# Patient Record
Sex: FEMALE | Race: WHITE | NOT HISPANIC OR LATINO | Employment: OTHER | URBAN - NONMETROPOLITAN AREA
[De-identification: names, ages, dates, MRNs, and addresses within clinical notes are randomized per-mention and may not be internally consistent; named-entity substitution may affect disease eponyms.]

---

## 2023-03-13 PROBLEM — E78.5 HYPERLIPIDEMIA, UNSPECIFIED: Status: ACTIVE | Noted: 2023-03-13

## 2023-03-13 PROBLEM — R91.1 LUNG NODULE SEEN ON IMAGING STUDY: Status: ACTIVE | Noted: 2023-03-13

## 2023-03-13 PROBLEM — S93.401A SPRAIN OF RIGHT ANKLE: Status: ACTIVE | Noted: 2023-03-13

## 2023-03-13 PROBLEM — K22.89 MASS OF ESOPHAGUS: Status: ACTIVE | Noted: 2023-03-13

## 2023-03-13 PROBLEM — R00.2 PALPITATIONS: Status: ACTIVE | Noted: 2023-03-13

## 2023-03-13 PROBLEM — R73.9 ELEVATED BLOOD SUGAR: Status: ACTIVE | Noted: 2023-03-13

## 2023-03-13 PROBLEM — M79.89 MASS OF SOFT TISSUE OF CHEST: Status: ACTIVE | Noted: 2023-03-13

## 2023-03-13 PROBLEM — M81.0 OSTEOPOROSIS: Status: ACTIVE | Noted: 2023-03-13

## 2023-03-13 PROBLEM — Q45.8 ENTERIC DUPLICATION CYST: Status: ACTIVE | Noted: 2023-03-13

## 2023-03-13 PROBLEM — E03.9 HYPOTHYROID: Status: ACTIVE | Noted: 2023-03-13

## 2023-03-13 PROBLEM — N95.2 ATROPHIC VAGINITIS: Status: ACTIVE | Noted: 2023-03-13

## 2023-03-13 PROBLEM — K64.9 HEMORRHOID: Status: ACTIVE | Noted: 2023-03-13

## 2023-03-13 PROBLEM — Q39.8 CONGENITAL DUPLICATION CYST OF ESOPHAGUS: Status: ACTIVE | Noted: 2023-03-13

## 2023-03-13 PROBLEM — K44.9 HIATAL HERNIA: Status: ACTIVE | Noted: 2023-03-13

## 2023-03-13 PROBLEM — M50.90 CERVICAL DISC DISEASE: Status: ACTIVE | Noted: 2023-03-13

## 2023-03-13 PROBLEM — E66.3 OVERWEIGHT WITH BODY MASS INDEX (BMI) OF 28 TO 28.9 IN ADULT: Status: ACTIVE | Noted: 2023-03-13

## 2023-03-13 PROBLEM — R53.82 CHRONIC FATIGUE: Status: ACTIVE | Noted: 2023-03-13

## 2023-03-13 RX ORDER — ESTRADIOL 10 UG/1
INSERT VAGINAL
COMMUNITY

## 2023-03-13 RX ORDER — LEVOTHYROXINE SODIUM 112 UG/1
1 TABLET ORAL DAILY
COMMUNITY
Start: 2018-03-23 | End: 2023-07-18

## 2023-03-13 RX ORDER — ALENDRONATE SODIUM 70 MG/1
70 TABLET ORAL
COMMUNITY
Start: 2022-01-07 | End: 2023-03-24 | Stop reason: SINTOL

## 2023-03-13 RX ORDER — MULTIVITAMIN
TABLET ORAL
COMMUNITY

## 2023-03-24 ENCOUNTER — TELEMEDICINE (OUTPATIENT)
Dept: PRIMARY CARE | Facility: CLINIC | Age: 63
End: 2023-03-24
Payer: COMMERCIAL

## 2023-03-24 DIAGNOSIS — K21.9 GASTROESOPHAGEAL REFLUX DISEASE WITHOUT ESOPHAGITIS: Primary | ICD-10-CM

## 2023-03-24 PROBLEM — S93.401A SPRAIN OF RIGHT ANKLE: Status: RESOLVED | Noted: 2023-03-13 | Resolved: 2023-03-24

## 2023-03-24 PROCEDURE — 99213 OFFICE O/P EST LOW 20 MIN: CPT | Performed by: FAMILY MEDICINE

## 2023-03-24 NOTE — ASSESSMENT & PLAN NOTE
Discussed with her that without other sx and the HR fine - was likely gerd -   Stop alendronate and try pepcid or prilosec daily for 3 mos -   She agreed to plan - to let me know if not getting better

## 2023-03-24 NOTE — PROGRESS NOTES
Subjective   Patient ID: Neelam Garsia is a 63 y.o. female who presents for HEART ISSUES.    HPI     2 episodes of chest pain - radiating into her back  -   Did have nausea with the first one     But on heart monitor all was fine    HR  83 - 83 when she was having her pain     Did eat greasy foods that night   S/p jeyson     ON alendronate weekly     Pepcid does help  - and it did help with the last episodes       Review of Systems    Objective   There were no vitals taken for this visit.    Physical Exam    NAD     Assessment/Plan   Problem List Items Addressed This Visit    None  Visit Diagnoses       Gastroesophageal reflux disease without esophagitis    -  Primary          Discussed with her that without other sx and the HR fine - was likely gerd -   Stop alendronate and try pepcid or prilosec daily for 3 mos -   She agreed to plan - to let me know if not getting better

## 2023-07-18 DIAGNOSIS — E03.9 HYPOTHYROIDISM, UNSPECIFIED TYPE: Primary | ICD-10-CM

## 2023-07-18 RX ORDER — LEVOTHYROXINE SODIUM 112 UG/1
TABLET ORAL
Qty: 90 TABLET | Refills: 1 | Status: SHIPPED | OUTPATIENT
Start: 2023-07-18 | End: 2024-01-19

## 2024-01-04 ENCOUNTER — APPOINTMENT (OUTPATIENT)
Dept: PRIMARY CARE | Facility: CLINIC | Age: 64
End: 2024-01-04
Payer: COMMERCIAL

## 2024-01-10 ENCOUNTER — APPOINTMENT (OUTPATIENT)
Dept: PRIMARY CARE | Facility: CLINIC | Age: 64
End: 2024-01-10
Payer: COMMERCIAL

## 2024-01-19 DIAGNOSIS — E03.9 HYPOTHYROIDISM, UNSPECIFIED TYPE: ICD-10-CM

## 2024-01-19 RX ORDER — LEVOTHYROXINE SODIUM 112 UG/1
TABLET ORAL
Qty: 90 TABLET | Refills: 0 | Status: SHIPPED | OUTPATIENT
Start: 2024-01-19 | End: 2024-04-25

## 2024-02-20 ASSESSMENT — PROMIS GLOBAL HEALTH SCALE
RATE_AVERAGE_FATIGUE: MILD
CARRYOUT_SOCIAL_ACTIVITIES: EXCELLENT
RATE_GENERAL_HEALTH: GOOD
RATE_MENTAL_HEALTH: EXCELLENT
CARRYOUT_PHYSICAL_ACTIVITIES: COMPLETELY
EMOTIONAL_PROBLEMS: RARELY
RATE_QUALITY_OF_LIFE: VERY GOOD
RATE_AVERAGE_PAIN: 2
RATE_SOCIAL_SATISFACTION: EXCELLENT
RATE_PHYSICAL_HEALTH: GOOD

## 2024-02-21 ENCOUNTER — OFFICE VISIT (OUTPATIENT)
Dept: PRIMARY CARE | Facility: CLINIC | Age: 64
End: 2024-02-21
Payer: COMMERCIAL

## 2024-02-21 VITALS
DIASTOLIC BLOOD PRESSURE: 62 MMHG | SYSTOLIC BLOOD PRESSURE: 104 MMHG | HEART RATE: 80 BPM | BODY MASS INDEX: 28.86 KG/M2 | OXYGEN SATURATION: 97 % | HEIGHT: 60 IN | WEIGHT: 147 LBS

## 2024-02-21 DIAGNOSIS — E78.5 HYPERLIPIDEMIA, UNSPECIFIED HYPERLIPIDEMIA TYPE: ICD-10-CM

## 2024-02-21 DIAGNOSIS — E03.9 HYPOTHYROIDISM, UNSPECIFIED TYPE: ICD-10-CM

## 2024-02-21 DIAGNOSIS — Z00.00 WELLNESS EXAMINATION: Primary | ICD-10-CM

## 2024-02-21 DIAGNOSIS — M81.0 OSTEOPOROSIS, UNSPECIFIED OSTEOPOROSIS TYPE, UNSPECIFIED PATHOLOGICAL FRACTURE PRESENCE: ICD-10-CM

## 2024-02-21 LAB
25(OH)D3 SERPL-MCNC: 65 NG/ML (ref 30–100)
ALBUMIN SERPL BCP-MCNC: 4.4 G/DL (ref 3.4–5)
ALP SERPL-CCNC: 66 U/L (ref 33–136)
ALT SERPL W P-5'-P-CCNC: 20 U/L (ref 7–45)
ANION GAP SERPL CALC-SCNC: 12 MMOL/L (ref 10–20)
AST SERPL W P-5'-P-CCNC: 18 U/L (ref 9–39)
BASOPHILS # BLD AUTO: 0.02 X10*3/UL (ref 0–0.1)
BASOPHILS NFR BLD AUTO: 0.3 %
BILIRUB SERPL-MCNC: 0.6 MG/DL (ref 0–1.2)
BUN SERPL-MCNC: 17 MG/DL (ref 6–23)
CALCIUM SERPL-MCNC: 10.2 MG/DL (ref 8.6–10.3)
CHLORIDE SERPL-SCNC: 106 MMOL/L (ref 98–107)
CHOLEST SERPL-MCNC: 277 MG/DL (ref 0–199)
CHOLESTEROL/HDL RATIO: 6.4
CO2 SERPL-SCNC: 27 MMOL/L (ref 21–32)
CREAT SERPL-MCNC: 0.72 MG/DL (ref 0.5–1.05)
EGFRCR SERPLBLD CKD-EPI 2021: >90 ML/MIN/1.73M*2
EOSINOPHIL # BLD AUTO: 0.14 X10*3/UL (ref 0–0.7)
EOSINOPHIL NFR BLD AUTO: 2.3 %
ERYTHROCYTE [DISTWIDTH] IN BLOOD BY AUTOMATED COUNT: 12.7 % (ref 11.5–14.5)
GLUCOSE SERPL-MCNC: 93 MG/DL (ref 74–99)
HCT VFR BLD AUTO: 47 % (ref 36–46)
HDLC SERPL-MCNC: 43.3 MG/DL
HGB BLD-MCNC: 15.7 G/DL (ref 12–16)
IMM GRANULOCYTES # BLD AUTO: 0.01 X10*3/UL (ref 0–0.7)
IMM GRANULOCYTES NFR BLD AUTO: 0.2 % (ref 0–0.9)
LDLC SERPL CALC-MCNC: 203 MG/DL
LYMPHOCYTES # BLD AUTO: 1.86 X10*3/UL (ref 1.2–4.8)
LYMPHOCYTES NFR BLD AUTO: 30.8 %
MCH RBC QN AUTO: 31 PG (ref 26–34)
MCHC RBC AUTO-ENTMCNC: 33.4 G/DL (ref 32–36)
MCV RBC AUTO: 93 FL (ref 80–100)
MONOCYTES # BLD AUTO: 0.68 X10*3/UL (ref 0.1–1)
MONOCYTES NFR BLD AUTO: 11.3 %
NEUTROPHILS # BLD AUTO: 3.32 X10*3/UL (ref 1.2–7.7)
NEUTROPHILS NFR BLD AUTO: 55.1 %
NON HDL CHOLESTEROL: 234 MG/DL (ref 0–149)
NRBC BLD-RTO: 0 /100 WBCS (ref 0–0)
PLATELET # BLD AUTO: 241 X10*3/UL (ref 150–450)
POTASSIUM SERPL-SCNC: 4.6 MMOL/L (ref 3.5–5.3)
PROT SERPL-MCNC: 6.8 G/DL (ref 6.4–8.2)
RBC # BLD AUTO: 5.07 X10*6/UL (ref 4–5.2)
SODIUM SERPL-SCNC: 140 MMOL/L (ref 136–145)
TRIGL SERPL-MCNC: 155 MG/DL (ref 0–149)
TSH SERPL-ACNC: 0.86 MIU/L (ref 0.44–3.98)
VLDL: 31 MG/DL (ref 0–40)
WBC # BLD AUTO: 6 X10*3/UL (ref 4.4–11.3)

## 2024-02-21 PROCEDURE — 80053 COMPREHEN METABOLIC PANEL: CPT

## 2024-02-21 PROCEDURE — 84443 ASSAY THYROID STIM HORMONE: CPT

## 2024-02-21 PROCEDURE — 36415 COLL VENOUS BLD VENIPUNCTURE: CPT

## 2024-02-21 PROCEDURE — 99396 PREV VISIT EST AGE 40-64: CPT | Performed by: FAMILY MEDICINE

## 2024-02-21 PROCEDURE — 1036F TOBACCO NON-USER: CPT | Performed by: FAMILY MEDICINE

## 2024-02-21 PROCEDURE — 82306 VITAMIN D 25 HYDROXY: CPT

## 2024-02-21 PROCEDURE — 99214 OFFICE O/P EST MOD 30 MIN: CPT | Performed by: FAMILY MEDICINE

## 2024-02-21 PROCEDURE — 80061 LIPID PANEL: CPT

## 2024-02-21 PROCEDURE — 85025 COMPLETE CBC W/AUTO DIFF WBC: CPT

## 2024-02-21 RX ORDER — LANOLIN ALCOHOL/MO/W.PET/CERES
1000 CREAM (GRAM) TOPICAL DAILY
COMMUNITY

## 2024-02-21 NOTE — PROGRESS NOTES
Subjective   Patient ID: Neelam Garsia is a 64 y.o. female who presents for Annual Exam (Yearly physical, not WWE).    HPI   LOV 10/2022 - wellness and check up   3/2023 - Tele appt - GERD sx -  Stopped alendronate and to take PPI     12/2022 - her for palpitations   Ziopatch ordered  - did not have signif findings       Updates and Concerns:       Having fun with grand-babies  Moved closer!        Feeling better off alendronate -   GERD is rare now           Chronic issues reviwed today:   HYPOTHRYOID - due for TSH check   Diagnosed about 2008   On levo 112 mcg,   Takes med regularly.     Osteoporosis -    Alendronate was started 12/2021   Stopped alendronate 2023 due to side effects     Hyperlipidemia -   Tends to run high, was on statin in the past she did not tolerate   2018  HDL 39.6   2019  HDL 40    2020  HDL 38    2022  HDL 35.5            [place]  -         [facility] -   Due in June   Last pap -   LMP - periods stopped about age 53  On vagifem tabs     Last mammogram - 7/2023   Breast issues? - none  No BRCA in family      Bone density  Last DEXA - 12/2021 - osteoporosis -   ON alendronate since 12/2021  - stopped in 2023   fracture history - NONE  Ca/Vit D: she thinks ok , on Vit D      Last colonoscopy - 11/4/ 2022-  WNL      Last cholesterol - When she exercises and takes eat better - chol improves      Last blood sugar - 2018 95     2019 102      2020 103 A1C 5.3% - trying to stop pepsi      Need coronary calcium score? - 2/2020 - low Coronary CT score - 39       small lung nodules seen - CT of chest done 2/2020 and 5/2020 - lung nodules stable    but nodule near esophagus - referred to CT surgeon - DR Al Martínez - she states    it was monitored for 2 years and was stable -       CT on chart 9/7/2021 - was stable 18 mos              11/2022 - stable - no further follow up      Hep C screen? - NEG 2019   HIV screen? - LOW RISK     "  vaccines -  Flu - did not get one for 2 years   Pneumonia  - plan at 66  RSV - not yet   COVID -  had primary series x 2   Tdap -   Shingrix - did not get those      vision -  last year       dentist - goes regularly     BMI/weight - 28   nutrition -\"not horrible\"   exercise -   5 - 6 days a week - 60 - 90 min      depression - she feels its ok      sleep - DOING WELL  - has nights when     smoking status - smoked a small amout for 2 years - years ago   Need Screening Lung CT? -see above      alcohol consumption - occas. weekend - MORE RARE NOW       LIVING WILL/MEDICAL POA - DOES NOT HAVE THEM - would like them       Review of Systems    Objective   /62 (BP Location: Right arm, Patient Position: Sitting, BP Cuff Size: Large adult)   Pulse 80   Ht 1.53 m (5' 0.25\")   Wt 66.7 kg (147 lb)   SpO2 97%   BMI 28.47 kg/m²     Physical Exam  Vitals reviewed.   Constitutional:       General: She is not in acute distress.     Appearance: Normal appearance. She is not ill-appearing, toxic-appearing or diaphoretic.   HENT:      Head: Normocephalic and atraumatic.      Right Ear: Tympanic membrane, ear canal and external ear normal.      Left Ear: Tympanic membrane, ear canal and external ear normal.      Nose: Nose normal.      Mouth/Throat:      Mouth: Mucous membranes are moist.      Pharynx: No posterior oropharyngeal erythema.   Eyes:      General: No scleral icterus.     Extraocular Movements: Extraocular movements intact.      Pupils: Pupils are equal, round, and reactive to light.   Cardiovascular:      Rate and Rhythm: Normal rate and regular rhythm.      Pulses: Normal pulses.      Heart sounds: No murmur heard.  Pulmonary:      Effort: Pulmonary effort is normal. No respiratory distress.      Breath sounds: Normal breath sounds. No wheezing.   Abdominal:      General: Bowel sounds are normal. There is no distension.      Palpations: Abdomen is soft.      Tenderness: There is no abdominal tenderness. "   Musculoskeletal:         General: Normal range of motion.      Cervical back: Neck supple. No rigidity.      Right lower leg: No edema.      Left lower leg: No edema.   Lymphadenopathy:      Cervical: No cervical adenopathy.   Skin:     General: Skin is warm and dry.      Findings: No rash.   Neurological:      General: No focal deficit present.      Mental Status: She is alert and oriented to person, place, and time.   Psychiatric:         Mood and Affect: Mood normal.         Thought Content: Thought content normal.         Assessment/Plan   Problem List Items Addressed This Visit             ICD-10-CM       High    Hypothyroid E03.9    Osteoporosis M81.0       Medium    Hyperlipidemia, unspecified E78.5     Other Visit Diagnoses         Codes    Wellness examination    -  Primary Z00.00          Gen wellness today     And follow up for thyroid, chol and osteoporosis    Working hard at eating better and better exercise     Check on labs     Urged dexa    Sees gyn     We discussed at visit any disease processes that were of concern as well as the risks, benefits and instructions of any new medication provided.    See orders and discussion section for information handed to patient on their Clinical Summary.   Patient (and/or caretaker of patient if present)  stated all questions were answered, and they voiced understanding of instructions.

## 2024-02-21 NOTE — PATIENT INSTRUCTIONS
Try the Mediterranean diet for your cholesterol       When you have your living will and medical poa papers done - bring copies in       TAKING THYROID MEDICATION     It's very important that you take your thyroid medication first thing in the morning on an empty stomach.   You should only take it with water.  You should wait at least an hour before eating or drinking anything else.   Do not eat or drink (or take pills) that have iron or calcium in them for at least 3 hours after taking your thyroid medication.  Iron and calcium significantly effect the absorption of the medicine.     Try very hard to remember to take your pill every day.    Even missing one dose can effect your thyroid levels.   If you miss doses, its important to tell your provider when you are having your blood drawn.     If we adjust your dose of your medication, its very important to be sure you have a recheck of your thyroid level about 6 weeks after that adjustment.    This might need to be an office visit, or just a lab appointment - discuss that with your provider to see which they prefer.      Thyroid medication is most often a lifelong medication.  Never stop taking your thyroid medication unless your provider tells you to do so.         WELL VISIT/PREVENTATIVE VISIT INSTRUCTIONS:        Call [phone] to schedule any testing ordered at [facility].      For a mammogram, call   [phone] .    Please work on healthy nutrition,  optimal sleep, and regular exercise to feel better.    If you smoke - please quit, and if you drink alcohol, please limit your intake.       Be sure to ask me about any vaccines you may be due for,  and ask me any questions you may have about vaccines.   Generally -  a flu shot is recommended every fall for everyone over 6 months of age,  a pneumonia shot is recommended for anyone 65 and over or who has chronic conditions such as diabetes, heart or lung disease,  the shingles vaccines are recommended for  "people age 50 and over,   a Tetnas/Pertussis booster is very 10 years (after the childhood set);  the RSV vaccine is for people age 65 and over,  and the COVID vaccines are recommended for everyone, but the boosters are often particular people, so ask me if you qualify.      There may be more vaccines you qualify for depending on your medical conditions, so be sure to ask me about that if you have any chronic illnesses.    Some people have insurance that will pay for the vaccines at the pharmacy, and some your doctors office - so be sure to check with your insurance about the best place to get your vaccines and your coverage of them.     Generally speaking,  good nutrition consists of lean meats, like chicken, fish and turkey (not fried);    plenty of fruits and vegetables (the fresher the better);   Less sugars, saturated fats, and processed foods - especially those made with white flour.   Try to eat the majority of your grain foods  as whole grains.   Keep an eye on your total calories in a day and serving sizes on packaging - this will help you limit your overall intake.    Remember, to lose weight (if you need to), your total calorie intake has to be about 300 - 500 calories less than what you burn in a day.   That number depends on your age, gender and body size.   Some people find a fitbit (calorie tracking device) helpful.      Please be sure to see the dentist every 6 months.    Please see the eye doctor no longer than every 2 years.    When you have your Living Will and Medical Power of  papers completed   (they have to be witnessed by 2 non-relatives or notarized to be legally binding),     please keep your originals in a safe place in your home, but make sure all your physicians have copies and that you take copies with you when you go to the hospital.        GETTING TEST RESULTS:    YOU WILL ALWAYS FIND OUT ABOUT ALL YOUR TEST RESULTS FROM ME.  I do not use the \"no news is good news\" policy. " "  The only time you would not, is if I have told you to get the testing done, and make a follow up appointment to go over it.    Then I will be waiting to talk to you at the visit about your test results.     I have a few different ways I get test results to you  (if its something urgent, we call you)  :       If you have a Secureach card -  I will have your test results on your secureach card within a week.  You should get a phone call telling you when the results are on the card, or just call the card in a week.   If two weeks go  by and you have not heard anything, call the card to see if the results are there,  and if not,  leave me a message.  If you are having trouble using [employer]ach, they have a support line you should call :   [phone];   If you have lost your card, I need to know.     IT'S VERY IMPORTANT THAT YOU CALL TO LISTEN TO YOUR RESULTS, AS IT THEN LETS ME KNOW YOU GOT YOUR RESULTS.        If you get your test results on MY CHART,  you may commonly see your results even before I do.      I will always annotate a message on your results so you know that I saw them and how I feel about them.     If you need some help with MY CHART call the support number at [phone].    IF I mail your results to you,   I need to hear from you if you do not receive them within 2 weeks.      Be sure to let me know your preference for getting results.         BILLING FOR PREVENTATIVE VISITS.     Most insurance companies pay for a yearly \"Wellness Check\"  or they may call it a \"Preventative Physical\"   - but it's important to know what your plan covers ahead of the visit.    It's also a good idea to find out what sort of preventative testing they will cover for screening tests - like cholesterol, blood sugar, or colonoscopies. Also, find out which vaccines are covered and if you have any responsibility in paying for them.       If at your Wellness Visit,  we cover anything to do with problems/issues  you " are having or  chronic medications/ medical conditions you have,   there will ALSO be a regular office charge that day.     Blood work  or testing obtained that has anything to do with an acute issue or chronic condition is billed under that diagnosis and not screening.       It's a good idea to find out from your insurance what is covered and what is your responsibility for all of the above possible charges.           Most importantly,   if you ever have any questions or concerns that cannot wait until your next visit with me,  you can message me through MY CHART or call the office and leave a voice mail message  (please allow for at least 24 hours for responses for these messages).       Take good care.   Dr Del Cid      For your bone health,  you want to be getting at least 3 servings of a high calcium food or drink every day.  For example:  1 cup of milk, 1 cup of yogurt, or 1 ounce of hard cheese   EACH  has 300 mg.  You can also find calcium in some non-dairy foods such as broccoli and almonds.  The daily goal  is 1000 - 1200 mg of TOTAL calcium intake a day.   If you are able,  you can easily find a list of high calcium foods on the Internet.      Also, most people need to take a Vitamin D 3 supplement,   For small children, the dose is 400 IU a day, and older children should have 800 IU a day.   Adults can be 1000 - 2000 IU a day, with some needing 5000 a day.   Be sure to verify  how much you should take.   LOOK FOR USP CERTIFIED vitamins.    This is a label you will find on the vitamins which verifies how good they are.

## 2024-03-01 ENCOUNTER — APPOINTMENT (OUTPATIENT)
Dept: RADIOLOGY | Facility: HOSPITAL | Age: 64
End: 2024-03-01
Payer: COMMERCIAL

## 2024-03-06 ENCOUNTER — HOSPITAL ENCOUNTER (OUTPATIENT)
Dept: RADIOLOGY | Facility: HOSPITAL | Age: 64
Discharge: HOME | End: 2024-03-06
Payer: COMMERCIAL

## 2024-03-06 DIAGNOSIS — M81.0 OSTEOPOROSIS, UNSPECIFIED OSTEOPOROSIS TYPE, UNSPECIFIED PATHOLOGICAL FRACTURE PRESENCE: ICD-10-CM

## 2024-03-06 PROCEDURE — 77080 DXA BONE DENSITY AXIAL: CPT | Performed by: RADIOLOGY

## 2024-03-06 PROCEDURE — 77080 DXA BONE DENSITY AXIAL: CPT

## 2024-03-11 ENCOUNTER — PATIENT MESSAGE (OUTPATIENT)
Dept: PRIMARY CARE | Facility: CLINIC | Age: 64
End: 2024-03-11
Payer: COMMERCIAL

## 2024-03-11 DIAGNOSIS — M81.0 OSTEOPOROSIS, UNSPECIFIED OSTEOPOROSIS TYPE, UNSPECIFIED PATHOLOGICAL FRACTURE PRESENCE: Primary | ICD-10-CM

## 2024-03-11 NOTE — TELEPHONE ENCOUNTER
From: Neelam Garsia  To: Beth Jean MD  Sent: 3/11/2024 10:14 AM EDT  Subject: Bone Density     Good Morning,  Per your message, I would like a referral for a rheumatologist just to see what medication options there are for osteoporosis and then I will decide which route to go.    If the one you suggest is not covered by my insurance, am I able to just pick one off of the insurance list?    Thank you!  Neelam Garsia

## 2024-04-25 DIAGNOSIS — E03.9 HYPOTHYROIDISM, UNSPECIFIED TYPE: ICD-10-CM

## 2024-04-25 RX ORDER — LEVOTHYROXINE SODIUM 112 UG/1
TABLET ORAL
Qty: 90 TABLET | Refills: 2 | Status: SHIPPED | OUTPATIENT
Start: 2024-04-25

## 2024-07-23 ENCOUNTER — HOSPITAL ENCOUNTER (OUTPATIENT)
Dept: RADIOLOGY | Facility: HOSPITAL | Age: 64
Discharge: HOME | End: 2024-07-23
Payer: COMMERCIAL

## 2024-07-23 VITALS — WEIGHT: 146 LBS | BODY MASS INDEX: 27.56 KG/M2 | HEIGHT: 61 IN

## 2024-07-23 DIAGNOSIS — Z12.31 BREAST CANCER SCREENING BY MAMMOGRAM: ICD-10-CM

## 2024-07-23 PROCEDURE — 77063 BREAST TOMOSYNTHESIS BI: CPT | Performed by: RADIOLOGY

## 2024-07-23 PROCEDURE — 77067 SCR MAMMO BI INCL CAD: CPT | Performed by: RADIOLOGY

## 2024-07-23 PROCEDURE — 77067 SCR MAMMO BI INCL CAD: CPT

## 2024-09-30 ENCOUNTER — TELEPHONE (OUTPATIENT)
Dept: PRIMARY CARE | Facility: CLINIC | Age: 64
End: 2024-09-30
Payer: COMMERCIAL

## 2024-09-30 DIAGNOSIS — M81.0 OSTEOPOROSIS, UNSPECIFIED OSTEOPOROSIS TYPE, UNSPECIFIED PATHOLOGICAL FRACTURE PRESENCE: Primary | ICD-10-CM

## 2024-09-30 RX ORDER — IBANDRONATE SODIUM 150 MG/1
150 TABLET, FILM COATED ORAL
Qty: 1 TABLET | Refills: 11 | Status: SHIPPED | OUTPATIENT
Start: 2024-09-30 | End: 2025-09-30

## 2024-09-30 NOTE — TELEPHONE ENCOUNTER
It is Rheum     Let her know I sent in Boniva for her to try to mario   
Pt informed   
Would you send me in that once a month mediation for osteoporosis?  We talked about it in February BUT I can't get in to see a rheumatologist until next June (this is what I wanted to try first) but I don't know if I should wait that long?  Or do I need it right now?    (SHE SAID RHEUMATOLOGIST but I think she may have meant to say ORTHO-  
08-Oct-2021 20:53

## 2024-10-22 ENCOUNTER — HOSPITAL ENCOUNTER (OUTPATIENT)
Dept: RADIOLOGY | Facility: HOSPITAL | Age: 64
Discharge: HOME | End: 2024-10-22
Payer: COMMERCIAL

## 2024-10-22 DIAGNOSIS — M81.0 AGE-RELATED OSTEOPOROSIS WITHOUT CURRENT PATHOLOGICAL FRACTURE: ICD-10-CM

## 2024-10-22 PROCEDURE — 72072 X-RAY EXAM THORAC SPINE 3VWS: CPT

## 2024-10-22 PROCEDURE — 72110 X-RAY EXAM L-2 SPINE 4/>VWS: CPT

## 2024-10-22 PROCEDURE — 72072 X-RAY EXAM THORAC SPINE 3VWS: CPT | Performed by: RADIOLOGY

## 2024-10-22 PROCEDURE — 72110 X-RAY EXAM L-2 SPINE 4/>VWS: CPT | Performed by: RADIOLOGY

## 2024-11-06 ENCOUNTER — APPOINTMENT (OUTPATIENT)
Dept: PRIMARY CARE | Facility: CLINIC | Age: 64
End: 2024-11-06
Payer: COMMERCIAL

## 2024-12-11 ENCOUNTER — APPOINTMENT (OUTPATIENT)
Dept: CARDIOLOGY | Facility: HOSPITAL | Age: 64
End: 2024-12-11
Payer: COMMERCIAL

## 2024-12-11 ENCOUNTER — APPOINTMENT (OUTPATIENT)
Dept: RADIOLOGY | Facility: HOSPITAL | Age: 64
End: 2024-12-11
Payer: COMMERCIAL

## 2024-12-11 ENCOUNTER — HOSPITAL ENCOUNTER (OUTPATIENT)
Facility: HOSPITAL | Age: 64
Setting detail: OBSERVATION
Discharge: HOME | End: 2024-12-11
Attending: EMERGENCY MEDICINE | Admitting: INTERNAL MEDICINE
Payer: COMMERCIAL

## 2024-12-11 VITALS
SYSTOLIC BLOOD PRESSURE: 100 MMHG | OXYGEN SATURATION: 97 % | WEIGHT: 146.6 LBS | HEIGHT: 61 IN | BODY MASS INDEX: 27.68 KG/M2 | DIASTOLIC BLOOD PRESSURE: 65 MMHG | RESPIRATION RATE: 17 BRPM | HEART RATE: 77 BPM | TEMPERATURE: 97.9 F

## 2024-12-11 DIAGNOSIS — R07.9 CHEST PAIN, UNSPECIFIED TYPE: Primary | ICD-10-CM

## 2024-12-11 DIAGNOSIS — E78.5 HYPERLIPIDEMIA, UNSPECIFIED HYPERLIPIDEMIA TYPE: ICD-10-CM

## 2024-12-11 DIAGNOSIS — K21.9 GASTROESOPHAGEAL REFLUX DISEASE WITHOUT ESOPHAGITIS: ICD-10-CM

## 2024-12-11 LAB
ALBUMIN SERPL BCP-MCNC: 4.3 G/DL (ref 3.4–5)
ALP SERPL-CCNC: 78 U/L (ref 33–136)
ALT SERPL W P-5'-P-CCNC: 22 U/L (ref 7–45)
ANION GAP SERPL CALC-SCNC: 15 MMOL/L (ref 10–20)
AORTIC VALVE MEAN GRADIENT: 3 MMHG
AORTIC VALVE PEAK VELOCITY: 1.28 M/S
AST SERPL W P-5'-P-CCNC: 26 U/L (ref 9–39)
AV PEAK GRADIENT: 7 MMHG
AVA (PEAK VEL): 2.76 CM2
AVA (VTI): 2.6 CM2
BASOPHILS # BLD AUTO: 0.03 X10*3/UL (ref 0–0.1)
BASOPHILS NFR BLD AUTO: 0.3 %
BILIRUB SERPL-MCNC: 0.5 MG/DL (ref 0–1.2)
BUN SERPL-MCNC: 16 MG/DL (ref 6–23)
CALCIUM SERPL-MCNC: 10.1 MG/DL (ref 8.6–10.3)
CARDIAC TROPONIN I PNL SERPL HS: 3 NG/L (ref 0–13)
CARDIAC TROPONIN I PNL SERPL HS: 3 NG/L (ref 0–13)
CARDIAC TROPONIN I PNL SERPL HS: 4 NG/L (ref 0–13)
CHLORIDE SERPL-SCNC: 101 MMOL/L (ref 98–107)
CHOLEST SERPL-MCNC: 214 MG/DL (ref 0–199)
CHOLESTEROL/HDL RATIO: 6.3
CO2 SERPL-SCNC: 27 MMOL/L (ref 21–32)
CREAT SERPL-MCNC: 0.7 MG/DL (ref 0.5–1.05)
EGFRCR SERPLBLD CKD-EPI 2021: >90 ML/MIN/1.73M*2
EJECTION FRACTION APICAL 4 CHAMBER: 67.9
EJECTION FRACTION: 63 %
EOSINOPHIL # BLD AUTO: 0.19 X10*3/UL (ref 0–0.7)
EOSINOPHIL NFR BLD AUTO: 1.9 %
ERYTHROCYTE [DISTWIDTH] IN BLOOD BY AUTOMATED COUNT: 12.7 % (ref 11.5–14.5)
EST. AVERAGE GLUCOSE BLD GHB EST-MCNC: 105 MG/DL
GLUCOSE SERPL-MCNC: 112 MG/DL (ref 74–99)
HBA1C MFR BLD: 5.3 %
HCT VFR BLD AUTO: 45.6 % (ref 36–46)
HDLC SERPL-MCNC: 33.9 MG/DL
HGB BLD-MCNC: 15.5 G/DL (ref 12–16)
IMM GRANULOCYTES # BLD AUTO: 0.01 X10*3/UL (ref 0–0.7)
IMM GRANULOCYTES NFR BLD AUTO: 0.1 % (ref 0–0.9)
LDLC SERPL CALC-MCNC: 154 MG/DL
LEFT ATRIUM VOLUME AREA LENGTH INDEX BSA: 17.9 ML/M2
LEFT VENTRICLE INTERNAL DIMENSION DIASTOLE: 4.26 CM (ref 3.5–6)
LEFT VENTRICULAR OUTFLOW TRACT DIAMETER: 2.03 CM
LIPASE SERPL-CCNC: 18 U/L (ref 9–82)
LYMPHOCYTES # BLD AUTO: 3.82 X10*3/UL (ref 1.2–4.8)
LYMPHOCYTES NFR BLD AUTO: 38.2 %
MAGNESIUM SERPL-MCNC: 1.97 MG/DL (ref 1.6–2.4)
MCH RBC QN AUTO: 30.9 PG (ref 26–34)
MCHC RBC AUTO-ENTMCNC: 34 G/DL (ref 32–36)
MCV RBC AUTO: 91 FL (ref 80–100)
MITRAL VALVE E/A RATIO: 0.91
MONOCYTES # BLD AUTO: 1.04 X10*3/UL (ref 0.1–1)
MONOCYTES NFR BLD AUTO: 10.4 %
NEUTROPHILS # BLD AUTO: 4.92 X10*3/UL (ref 1.2–7.7)
NEUTROPHILS NFR BLD AUTO: 49.1 %
NON HDL CHOLESTEROL: 180 MG/DL (ref 0–149)
NRBC BLD-RTO: 0 /100 WBCS (ref 0–0)
PLATELET # BLD AUTO: 288 X10*3/UL (ref 150–450)
POTASSIUM SERPL-SCNC: 3.7 MMOL/L (ref 3.5–5.3)
PROT SERPL-MCNC: 6.7 G/DL (ref 6.4–8.2)
RBC # BLD AUTO: 5.01 X10*6/UL (ref 4–5.2)
RIGHT VENTRICLE FREE WALL PEAK S': 13 CM/S
RIGHT VENTRICLE PEAK SYSTOLIC PRESSURE: 21.4 MMHG
SODIUM SERPL-SCNC: 139 MMOL/L (ref 136–145)
TRICUSPID ANNULAR PLANE SYSTOLIC EXCURSION: 1.7 CM
TRIGL SERPL-MCNC: 133 MG/DL (ref 0–149)
VLDL: 27 MG/DL (ref 0–40)
WBC # BLD AUTO: 10 X10*3/UL (ref 4.4–11.3)

## 2024-12-11 PROCEDURE — 93306 TTE W/DOPPLER COMPLETE: CPT

## 2024-12-11 PROCEDURE — G0378 HOSPITAL OBSERVATION PER HR: HCPCS

## 2024-12-11 PROCEDURE — 80061 LIPID PANEL: CPT | Performed by: INTERNAL MEDICINE

## 2024-12-11 PROCEDURE — 96374 THER/PROPH/DIAG INJ IV PUSH: CPT | Mod: 59

## 2024-12-11 PROCEDURE — 84484 ASSAY OF TROPONIN QUANT: CPT | Performed by: EMERGENCY MEDICINE

## 2024-12-11 PROCEDURE — 78452 HT MUSCLE IMAGE SPECT MULT: CPT

## 2024-12-11 PROCEDURE — A9502 TC99M TETROFOSMIN: HCPCS | Performed by: NURSE PRACTITIONER

## 2024-12-11 PROCEDURE — 94760 N-INVAS EAR/PLS OXIMETRY 1: CPT

## 2024-12-11 PROCEDURE — 83735 ASSAY OF MAGNESIUM: CPT | Performed by: EMERGENCY MEDICINE

## 2024-12-11 PROCEDURE — 83036 HEMOGLOBIN GLYCOSYLATED A1C: CPT | Mod: GEALAB | Performed by: INTERNAL MEDICINE

## 2024-12-11 PROCEDURE — 3430000001 HC RX 343 DIAGNOSTIC RADIOPHARMACEUTICALS: Performed by: NURSE PRACTITIONER

## 2024-12-11 PROCEDURE — 36415 COLL VENOUS BLD VENIPUNCTURE: CPT | Performed by: EMERGENCY MEDICINE

## 2024-12-11 PROCEDURE — 99222 1ST HOSP IP/OBS MODERATE 55: CPT | Performed by: INTERNAL MEDICINE

## 2024-12-11 PROCEDURE — 71275 CT ANGIOGRAPHY CHEST: CPT | Performed by: RADIOLOGY

## 2024-12-11 PROCEDURE — 74174 CTA ABD&PLVS W/CONTRAST: CPT

## 2024-12-11 PROCEDURE — 99239 HOSP IP/OBS DSCHRG MGMT >30: CPT | Performed by: PHYSICIAN ASSISTANT

## 2024-12-11 PROCEDURE — 93017 CV STRESS TEST TRACING ONLY: CPT

## 2024-12-11 PROCEDURE — 78452 HT MUSCLE IMAGE SPECT MULT: CPT | Performed by: RADIOLOGY

## 2024-12-11 PROCEDURE — 74174 CTA ABD&PLVS W/CONTRAST: CPT | Performed by: RADIOLOGY

## 2024-12-11 PROCEDURE — 83690 ASSAY OF LIPASE: CPT | Performed by: EMERGENCY MEDICINE

## 2024-12-11 PROCEDURE — 80053 COMPREHEN METABOLIC PANEL: CPT | Performed by: EMERGENCY MEDICINE

## 2024-12-11 PROCEDURE — 93005 ELECTROCARDIOGRAM TRACING: CPT

## 2024-12-11 PROCEDURE — 84484 ASSAY OF TROPONIN QUANT: CPT | Performed by: INTERNAL MEDICINE

## 2024-12-11 PROCEDURE — 93306 TTE W/DOPPLER COMPLETE: CPT | Performed by: INTERNAL MEDICINE

## 2024-12-11 PROCEDURE — 99285 EMERGENCY DEPT VISIT HI MDM: CPT | Mod: 25 | Performed by: EMERGENCY MEDICINE

## 2024-12-11 PROCEDURE — 2500000002 HC RX 250 W HCPCS SELF ADMINISTERED DRUGS (ALT 637 FOR MEDICARE OP, ALT 636 FOR OP/ED): Performed by: INTERNAL MEDICINE

## 2024-12-11 PROCEDURE — 2550000001 HC RX 255 CONTRASTS: Performed by: EMERGENCY MEDICINE

## 2024-12-11 PROCEDURE — 85025 COMPLETE CBC W/AUTO DIFF WBC: CPT | Performed by: EMERGENCY MEDICINE

## 2024-12-11 PROCEDURE — 93005 ELECTROCARDIOGRAM TRACING: CPT | Mod: 59

## 2024-12-11 PROCEDURE — 96376 TX/PRO/DX INJ SAME DRUG ADON: CPT | Mod: 59

## 2024-12-11 RX ORDER — NAPROXEN SODIUM 220 MG/1
81 TABLET, FILM COATED ORAL DAILY
Status: DISCONTINUED | OUTPATIENT
Start: 2024-12-12 | End: 2024-12-11 | Stop reason: HOSPADM

## 2024-12-11 RX ORDER — ATORVASTATIN CALCIUM 80 MG/1
40 TABLET, FILM COATED ORAL NIGHTLY
Qty: 15 TABLET | Refills: 1 | Status: SHIPPED | OUTPATIENT
Start: 2024-12-11 | End: 2025-02-09

## 2024-12-11 RX ORDER — ATORVASTATIN CALCIUM 80 MG/1
80 TABLET, FILM COATED ORAL NIGHTLY
Status: DISCONTINUED | OUTPATIENT
Start: 2024-12-11 | End: 2024-12-11 | Stop reason: HOSPADM

## 2024-12-11 RX ORDER — ACETAMINOPHEN 160 MG/5ML
650 SOLUTION ORAL EVERY 4 HOURS PRN
Status: DISCONTINUED | OUTPATIENT
Start: 2024-12-11 | End: 2024-12-11 | Stop reason: HOSPADM

## 2024-12-11 RX ORDER — ACETAMINOPHEN 650 MG/1
650 SUPPOSITORY RECTAL EVERY 4 HOURS PRN
Status: DISCONTINUED | OUTPATIENT
Start: 2024-12-11 | End: 2024-12-11 | Stop reason: HOSPADM

## 2024-12-11 RX ORDER — ENOXAPARIN SODIUM 100 MG/ML
40 INJECTION SUBCUTANEOUS EVERY 24 HOURS
Status: DISCONTINUED | OUTPATIENT
Start: 2024-12-11 | End: 2024-12-11 | Stop reason: HOSPADM

## 2024-12-11 RX ORDER — ACETAMINOPHEN 325 MG/1
650 TABLET ORAL EVERY 4 HOURS PRN
Status: DISCONTINUED | OUTPATIENT
Start: 2024-12-11 | End: 2024-12-11 | Stop reason: HOSPADM

## 2024-12-11 RX ORDER — LEVOTHYROXINE SODIUM 112 UG/1
112 TABLET ORAL
Status: DISCONTINUED | OUTPATIENT
Start: 2024-12-11 | End: 2024-12-11 | Stop reason: HOSPADM

## 2024-12-11 SDOH — SOCIAL STABILITY: SOCIAL INSECURITY: HAS ANYONE EVER THREATENED TO HURT YOUR FAMILY OR YOUR PETS?: NO

## 2024-12-11 SDOH — SOCIAL STABILITY: SOCIAL INSECURITY
WITHIN THE LAST YEAR, HAVE YOU BEEN RAPED OR FORCED TO HAVE ANY KIND OF SEXUAL ACTIVITY BY YOUR PARTNER OR EX-PARTNER?: NO

## 2024-12-11 SDOH — ECONOMIC STABILITY: HOUSING INSECURITY: IN THE PAST 12 MONTHS, HOW MANY TIMES HAVE YOU MOVED WHERE YOU WERE LIVING?: 0

## 2024-12-11 SDOH — SOCIAL STABILITY: SOCIAL INSECURITY: WITHIN THE LAST YEAR, HAVE YOU BEEN AFRAID OF YOUR PARTNER OR EX-PARTNER?: NO

## 2024-12-11 SDOH — SOCIAL STABILITY: SOCIAL INSECURITY: ABUSE: ADULT

## 2024-12-11 SDOH — ECONOMIC STABILITY: FOOD INSECURITY: WITHIN THE PAST 12 MONTHS, YOU WORRIED THAT YOUR FOOD WOULD RUN OUT BEFORE YOU GOT THE MONEY TO BUY MORE.: NEVER TRUE

## 2024-12-11 SDOH — ECONOMIC STABILITY: HOUSING INSECURITY: AT ANY TIME IN THE PAST 12 MONTHS, WERE YOU HOMELESS OR LIVING IN A SHELTER (INCLUDING NOW)?: NO

## 2024-12-11 SDOH — ECONOMIC STABILITY: HOUSING INSECURITY: IN THE LAST 12 MONTHS, WAS THERE A TIME WHEN YOU WERE NOT ABLE TO PAY THE MORTGAGE OR RENT ON TIME?: NO

## 2024-12-11 SDOH — ECONOMIC STABILITY: INCOME INSECURITY: IN THE PAST 12 MONTHS HAS THE ELECTRIC, GAS, OIL, OR WATER COMPANY THREATENED TO SHUT OFF SERVICES IN YOUR HOME?: NO

## 2024-12-11 SDOH — SOCIAL STABILITY: SOCIAL INSECURITY: ARE YOU OR HAVE YOU BEEN THREATENED OR ABUSED PHYSICALLY, EMOTIONALLY, OR SEXUALLY BY ANYONE?: NO

## 2024-12-11 SDOH — SOCIAL STABILITY: SOCIAL INSECURITY
WITHIN THE LAST YEAR, HAVE YOU BEEN KICKED, HIT, SLAPPED, OR OTHERWISE PHYSICALLY HURT BY YOUR PARTNER OR EX-PARTNER?: NO

## 2024-12-11 SDOH — SOCIAL STABILITY: SOCIAL INSECURITY: HAVE YOU HAD ANY THOUGHTS OF HARMING ANYONE ELSE?: NO

## 2024-12-11 SDOH — ECONOMIC STABILITY: FOOD INSECURITY: HOW HARD IS IT FOR YOU TO PAY FOR THE VERY BASICS LIKE FOOD, HOUSING, MEDICAL CARE, AND HEATING?: NOT HARD AT ALL

## 2024-12-11 SDOH — SOCIAL STABILITY: SOCIAL INSECURITY: ARE THERE ANY APPARENT SIGNS OF INJURIES/BEHAVIORS THAT COULD BE RELATED TO ABUSE/NEGLECT?: NO

## 2024-12-11 SDOH — SOCIAL STABILITY: SOCIAL INSECURITY: DO YOU FEEL ANYONE HAS EXPLOITED OR TAKEN ADVANTAGE OF YOU FINANCIALLY OR OF YOUR PERSONAL PROPERTY?: NO

## 2024-12-11 SDOH — ECONOMIC STABILITY: FOOD INSECURITY: WITHIN THE PAST 12 MONTHS, THE FOOD YOU BOUGHT JUST DIDN'T LAST AND YOU DIDN'T HAVE MONEY TO GET MORE.: NEVER TRUE

## 2024-12-11 SDOH — SOCIAL STABILITY: SOCIAL INSECURITY: WITHIN THE LAST YEAR, HAVE YOU BEEN HUMILIATED OR EMOTIONALLY ABUSED IN OTHER WAYS BY YOUR PARTNER OR EX-PARTNER?: NO

## 2024-12-11 SDOH — SOCIAL STABILITY: SOCIAL INSECURITY: WERE YOU ABLE TO COMPLETE ALL THE BEHAVIORAL HEALTH SCREENINGS?: YES

## 2024-12-11 SDOH — ECONOMIC STABILITY: TRANSPORTATION INSECURITY: IN THE PAST 12 MONTHS, HAS LACK OF TRANSPORTATION KEPT YOU FROM MEDICAL APPOINTMENTS OR FROM GETTING MEDICATIONS?: NO

## 2024-12-11 SDOH — SOCIAL STABILITY: SOCIAL INSECURITY: DOES ANYONE TRY TO KEEP YOU FROM HAVING/CONTACTING OTHER FRIENDS OR DOING THINGS OUTSIDE YOUR HOME?: NO

## 2024-12-11 SDOH — SOCIAL STABILITY: SOCIAL INSECURITY: HAVE YOU HAD THOUGHTS OF HARMING ANYONE ELSE?: NO

## 2024-12-11 SDOH — SOCIAL STABILITY: SOCIAL INSECURITY: DO YOU FEEL UNSAFE GOING BACK TO THE PLACE WHERE YOU ARE LIVING?: NO

## 2024-12-11 ASSESSMENT — HEART SCORE
AGE: 45-64
HISTORY: MODERATELY SUSPICIOUS
TROPONIN: LESS THAN OR EQUAL TO NORMAL LIMIT
RISK FACTORS: >2 RISK FACTORS OR HX OF ATHEROSCLEROTIC DISEASE
ECG: NORMAL
HEART SCORE: 4

## 2024-12-11 ASSESSMENT — COGNITIVE AND FUNCTIONAL STATUS - GENERAL
MOBILITY SCORE: 24
PATIENT BASELINE BEDBOUND: NO
DAILY ACTIVITIY SCORE: 24
DAILY ACTIVITIY SCORE: 24
MOBILITY SCORE: 24

## 2024-12-11 ASSESSMENT — ENCOUNTER SYMPTOMS
HEMATURIA: 0
DIARRHEA: 0
EYE PAIN: 0
WOUND: 0
SORE THROAT: 0
PALPITATIONS: 0
ARTHRALGIAS: 0
COUGH: 0
ABDOMINAL PAIN: 0
FEVER: 0
SHORTNESS OF BREATH: 1
BACK PAIN: 0
HEADACHES: 0
NERVOUS/ANXIOUS: 0
DYSURIA: 0
VOMITING: 0
DIZZINESS: 0
CHILLS: 0
DYSPHORIC MOOD: 0
NAUSEA: 1

## 2024-12-11 ASSESSMENT — PAIN - FUNCTIONAL ASSESSMENT
PAIN_FUNCTIONAL_ASSESSMENT: 0-10

## 2024-12-11 ASSESSMENT — PAIN SCALES - GENERAL
PAINLEVEL_OUTOF10: 0 - NO PAIN

## 2024-12-11 ASSESSMENT — ACTIVITIES OF DAILY LIVING (ADL)
ADEQUATE_TO_COMPLETE_ADL: YES
DRESSING YOURSELF: INDEPENDENT
WALKS IN HOME: INDEPENDENT
LACK_OF_TRANSPORTATION: NO
FEEDING YOURSELF: INDEPENDENT
PATIENT'S MEMORY ADEQUATE TO SAFELY COMPLETE DAILY ACTIVITIES?: YES
HEARING - RIGHT EAR: FUNCTIONAL
ASSISTIVE_DEVICE: EYEGLASSES
GROOMING: INDEPENDENT
TOILETING: INDEPENDENT
HEARING - LEFT EAR: FUNCTIONAL
BATHING: INDEPENDENT
LACK_OF_TRANSPORTATION: NO
JUDGMENT_ADEQUATE_SAFELY_COMPLETE_DAILY_ACTIVITIES: YES

## 2024-12-11 ASSESSMENT — COLUMBIA-SUICIDE SEVERITY RATING SCALE - C-SSRS
6. HAVE YOU EVER DONE ANYTHING, STARTED TO DO ANYTHING, OR PREPARED TO DO ANYTHING TO END YOUR LIFE?: NO
2. HAVE YOU ACTUALLY HAD ANY THOUGHTS OF KILLING YOURSELF?: NO
1. IN THE PAST MONTH, HAVE YOU WISHED YOU WERE DEAD OR WISHED YOU COULD GO TO SLEEP AND NOT WAKE UP?: NO

## 2024-12-11 ASSESSMENT — LIFESTYLE VARIABLES
HOW OFTEN DO YOU HAVE A DRINK CONTAINING ALCOHOL: NEVER
HOW OFTEN DO YOU HAVE 6 OR MORE DRINKS ON ONE OCCASION: NEVER
SKIP TO QUESTIONS 9-10: 1
AUDIT-C TOTAL SCORE: 0
HAVE PEOPLE ANNOYED YOU BY CRITICIZING YOUR DRINKING: NO
EVER HAD A DRINK FIRST THING IN THE MORNING TO STEADY YOUR NERVES TO GET RID OF A HANGOVER: NO
HOW MANY STANDARD DRINKS CONTAINING ALCOHOL DO YOU HAVE ON A TYPICAL DAY: PATIENT DOES NOT DRINK
TOTAL SCORE: 0
AUDIT-C TOTAL SCORE: 0
HAVE YOU EVER FELT YOU SHOULD CUT DOWN ON YOUR DRINKING: NO
EVER FELT BAD OR GUILTY ABOUT YOUR DRINKING: NO

## 2024-12-11 ASSESSMENT — PATIENT HEALTH QUESTIONNAIRE - PHQ9
2. FEELING DOWN, DEPRESSED OR HOPELESS: NOT AT ALL
SUM OF ALL RESPONSES TO PHQ9 QUESTIONS 1 & 2: 0
1. LITTLE INTEREST OR PLEASURE IN DOING THINGS: NOT AT ALL

## 2024-12-11 NOTE — CONSULTS
"Inpatient consult to Cardiology  Consult performed by: HUSSEIN Duque-CNP  Consult ordered by: Alexandra Tong PA-C        History Of Present Illness:    Neelam Garsia is a 64 y.o. female from home with h/o GERD, hld (statin intolerant), hypothyroidism, osteoporosis admitted with chest pain.  Was the passenger in the car yesterday when she developed sudden onset midsternal chest discomfort with associated diaphoresis and nausea.  Symptoms lasted for a few minutes and resolved.  Also radiated to the mid back.  Her and  went to lunch and she felt improved, however, at 1am she was lying in bed and recurrent onset of chest pain radiating to the back, so her  called 911.  Troponin 4, 3.  CTA c/a/p negative for PE, aortic aneurysm or dissection, +small hiatal hernia noted.  Chest pain resolved prior to EMS arrival to her home and has not reoccurred.      Last Recorded Vitals:  Vitals:    12/11/24 0600 12/11/24 0700 12/11/24 0741 12/11/24 1100   BP: 137/77 141/85 111/73    BP Location:   Right arm    Patient Position:   Lying    Pulse: 72 77 74    Resp: 18 18 18    Temp: 36.5 °C (97.7 °F)  36.5 °C (97.7 °F)    TempSrc:   Temporal    SpO2: 97% 96% 94% 95%   Weight:   66.5 kg (146 lb 9.6 oz)    Height:   1.549 m (5' 1\")        Last Labs:  CBC - 12/11/2024:  3:45 AM  10.0 15.5 288    45.6      CMP - 12/11/2024:  3:45 AM  10.1 6.7 26 --- 0.5   _ 4.3 22 78      PTT - No results in last year.  _   _ _     Troponin I, High Sensitivity   Date/Time Value Ref Range Status   12/11/2024 07:59 AM 3 0 - 13 ng/L Final   12/11/2024 05:10 AM 3 0 - 13 ng/L Final   12/11/2024 03:45 AM 4 0 - 13 ng/L Final     Hemoglobin A1C   Date/Time Value Ref Range Status   10/28/2022 11:00 AM 4.9 % Final     Comment:          Diagnosis of Diabetes-Adults   Non-Diabetic: < or = 5.6%   Increased risk for developing diabetes: 5.7-6.4%   Diagnostic of diabetes: > or = 6.5%  .       Monitoring of Diabetes                Age (y)     " Therapeutic Goal (%)   Adults:          >18           <7.0   Pediatrics:    13-18           <7.5                   7-12           <8.0                   0- 6            7.5-8.5   American Diabetes Association. Diabetes Care 33(S1), Jan 2010.     06/30/2021 12:00 PM 5.3 % Final     Comment:          Diagnosis of Diabetes-Adults   Non-Diabetic: < or = 5.6%   Increased risk for developing diabetes: 5.7-6.4%   Diagnostic of diabetes: > or = 6.5%  .       Monitoring of Diabetes                Age (y)     Therapeutic Goal (%)   Adults:          >18           <7.0   Pediatrics:    13-18           <7.5                   7-12           <8.0                   0- 6            7.5-8.5   American Diabetes Association. Diabetes Care 33(S1), Jan 2010.       LDL Calculated   Date/Time Value Ref Range Status   12/11/2024 05:10  (H) <=99 mg/dL Final     Comment:                                 Near   Borderline      AGE      Desirable  Optimal    High     High     Very High     0-19 Y     0 - 109     ---    110-129   >/= 130     ----    20-24 Y     0 - 119     ---    120-159   >/= 160     ----      >24 Y     0 -  99   100-129  130-159   160-189     >/=190     02/21/2024 10:14  (H) <=99 mg/dL Final     Comment:                                 Near   Borderline      AGE      Desirable  Optimal    High     High     Very High     0-19 Y     0 - 109     ---    110-129   >/= 130     ----    20-24 Y     0 - 119     ---    120-159   >/= 160     ----      >24 Y     0 -  99   100-129  130-159   160-189     >/=190       VLDL   Date/Time Value Ref Range Status   12/11/2024 05:10 AM 27 0 - 40 mg/dL Final   02/21/2024 10:14 AM 31 0 - 40 mg/dL Final   10/20/2022 11:00 AM 47 (H) 0 - 40 mg/dL Final   10/13/2020 09:46 AM 46 (H) 0 - 40 mg/dL Final   10/08/2019 11:32 AM 49 (H) 0 - 40 mg/dL Final      Last I/O:  No intake/output data recorded.    Past Cardiology Tests (Last 3 Years):  EKG:  ECG 12 lead 12/11/2024 (Preliminary)  NSR, HR 71bpm        Past Medical History:  She has a past medical history of Acute bronchitis due to other specified organisms (2020), Acute recurrent maxillary sinusitis (2015), Acute upper respiratory infection, unspecified (2017), Cellulitis of left upper limb (04/15/2021), Other conditions influencing health status (2017), Personal history of diseases of the skin and subcutaneous tissue (2014), Personal history of other diseases of the respiratory system (2014), Personal history of other diseases of the respiratory system (2016), Personal history of other diseases of the respiratory system (2020), Personal history of other endocrine, nutritional and metabolic disease, Personal history of other specified conditions (2016), Prediabetes (10/22/2022), Unspecified symptoms and signs involving the genitourinary system (2018), and Varicella without complication.    Past Surgical History:  She has a past surgical history that includes Colonoscopy (2015);  section, classic (2015); and Cholecystectomy (10/13/2017).      Social History:  She reports that she has quit smoking. Her smoking use included cigarettes. She has never used smokeless tobacco. She reports current alcohol use. She reports that she does not use drugs.    Family History:  Family History   Problem Relation Name Age of Onset    Other (cardiac disorder) Father          Allergies:  Cephalexin    Inpatient Medications:  Scheduled medications   Medication Dose Route Frequency    [START ON 2024] aspirin  81 mg oral Daily    atorvastatin  80 mg oral Nightly    enoxaparin  40 mg subcutaneous q24h    levothyroxine  112 mcg oral Daily before breakfast    perflutren lipid microspheres  0.5-10 mL of dilution intravenous Once in imaging    perflutren protein A microsphere  0.5 mL intravenous Once in imaging    sulfur hexafluoride microsphr  2 mL intravenous Once in imaging     PRN medications    Medication    acetaminophen    Or    acetaminophen    Or    acetaminophen     Continuous Medications   Medication Dose Last Rate     Outpatient Medications:  Current Outpatient Medications   Medication Instructions    cyanocobalamin (VITAMIN B-12) 1,000 mcg, Daily    estradiol (Vagifem) 10 mcg tablet vaginal tablet vaginal, 2 times weekly    ibandronate (BONIVA) 150 mg, oral, Every 30 days, Take in morning with full glass of water on an empty stomach. No food, drink, meds, or lying down for 60 minutes after.    levothyroxine (Synthroid, Levoxyl) 112 mcg tablet Take 1 tablet by mouth once daily    multivitamin tablet Take by mouth.    NON FORMULARY Vitamin d 25MCG (1000 UT) oral tablet       Physical Exam:  Constitutional: Pleasant, Awake/Alert/Oriented to person place and time.   Head: Atraumatic, Normocephalic  Eyes: EOMI. [place]  Neck: No JVD  Cardiovascular: Regular rate and rhythm, S1, S2. No extra heart sounds or murmurs  Respiratory: Clear to auscultation bilaterally. No wheezing, rales or rhonchi. Good chest wall expansion  Abdomen: Soft, Nontender. Bowel sounds appreciated  Musculoskeletal: ROM intact. Muscle strength grossly intact upper and lower extremities 5/5.   Neurological: CNII-XII intact. Sensation grossly intact  Extremities: Warm and dry. No acute rashes and lesions  Psychiatric: Appropriate mood and affect       Assessment/Plan   63yo female from home with h/o GERD, hld (statin intolerant), hypothyroidism, osteoporosis admitted with chest pain.      Assessment:  Chest pain-- ?anginal equivalent vs. GERD    Plan:  -Recommend obtaining an exercise nuclear stress test for further evaluation of stress induced ischemia.   -Agree with obtaining an echocardiogram for assessment of mechanical and structural function   -Consider GI follow up if cardiac testing is negative for acute pathology     Code Status:  Full Code    HUSSEIN Duque-CNP    Seen, discussed and examined with RADHA Palomino, above is  representative of my medical decision making.    Mak Sagastume MD

## 2024-12-11 NOTE — ED TRIAGE NOTES
Pt c/o mid chest pain that radiated to her back that started around 1 am. Pt given aspirin and Zofran in the squad.

## 2024-12-11 NOTE — H&P
Chief Complaint  Chest Pain    History Of Present Illness  Neelam Garsia is a 64 y.o. female with a history of hyperlipidemia, hypothyroidism, congenital duplication cyst of esophagus, GERD, osteoporosis, and hiatal hernia. She presented to the emergency department early on December 11 complaining of chest pain radiating to the back.  She noted a self-limited episode around 1300 on December 10 with associated nausea that resolved without specific intervention.  She was sitting in her truck on the way to the mall and it came on suddenly. She noted feeling short of breath and sick to her stock. Last five minutes, then shaky for a half hour. She notes this occurred on an empty stomach. Ate afterwards at 2 pm, no issues. She awoke with the pain while in bed, ultimately causing her to call EMS.  En route to the hospital she was given aspirin and ondansetron. Noted had a small episode in the ED, less intense. Hurts takes breath away when it occurs. Notes she goes to the gym three days a week and walks a lot without issues. Notes she has been having random dizziness once a month, like the room spinning. Comes on out of the blue. Took a pepcid at home when she had the symptoms. Has a history of heartburn, but this felt different (no burning). History of high cholesterol, has not been on medication x years (it caused muscle aches). Reported last total cholesterol was 288 and good cholesterol was low.     Laboratory evaluation in the emergency department was notable for glucose 112.  Troponins were negative x 2.  The remainder of CMP, lipase, CBC were unremarkable.  CT angiogram of the chest, abdomen, and pelvis showed no aortic aneurysm or dissection and no acute abnormality otherwise.  A 6 mm right lower lobe pulmonary nodule was stable in size.  No specific additional therapeutic interventions were employed while in the emergency department.  EKG tracing was reviewed and personally interpreted.  There was no evidence of  acute ischemia or arrhythmia.  Showed sinus rhythm with normal axis.  Patient was admitted for further evaluation and treatment.     Past Medical History  She has a past medical history of Acute bronchitis due to other specified organisms (2020), Acute recurrent maxillary sinusitis (2015), Acute upper respiratory infection, unspecified (2017), Cellulitis of left upper limb (04/15/2021), Other conditions influencing health status (2017), Personal history of diseases of the skin and subcutaneous tissue (2014), Personal history of other diseases of the respiratory system (2014), Personal history of other diseases of the respiratory system (2016), Personal history of other diseases of the respiratory system (2020), Personal history of other endocrine, nutritional and metabolic disease, Personal history of other specified conditions (2016), Prediabetes (10/22/2022), Unspecified symptoms and signs involving the genitourinary system (2018), and Varicella without complication.    Surgical History  She has a past surgical history that includes Colonoscopy (2015);  section, classic (2015); and Cholecystectomy (10/13/2017).     Social History  She reports that she has quit smoking. Her smoking use included cigarettes. She has never used smokeless tobacco. She reports current alcohol use. She reports that she does not use drugs.    Family History  Family History   Problem Relation Name Age of Onset    Other (cardiac disorder) Father          Allergies  Cephalexin    Review of Systems   Constitutional:  Negative for chills and fever.   HENT:  Negative for postnasal drip and sore throat.    Eyes:  Negative for pain and visual disturbance.   Respiratory:  Positive for shortness of breath. Negative for cough.    Cardiovascular:  Positive for chest pain. Negative for palpitations and leg swelling.   Gastrointestinal:  Positive for nausea. Negative for  abdominal pain, diarrhea and vomiting.   Genitourinary:  Negative for dysuria and hematuria.   Musculoskeletal:  Negative for arthralgias and back pain.   Skin:  Negative for rash and wound.   Neurological:  Negative for dizziness and headaches.   Psychiatric/Behavioral:  Negative for dysphoric mood. The patient is not nervous/anxious.         Physical Exam  Vitals and nursing note reviewed.   Constitutional:       General: She is not in acute distress.     Appearance: She is ill-appearing.   HENT:      Head: Normocephalic and atraumatic.      Right Ear: External ear normal.      Left Ear: External ear normal.      Nose: Nose normal. No rhinorrhea.      Mouth/Throat:      Mouth: Mucous membranes are moist.      Pharynx: Oropharynx is clear. No oropharyngeal exudate.   Eyes:      General: No scleral icterus.        Right eye: No discharge.         Left eye: No discharge.      Conjunctiva/sclera: Conjunctivae normal.   Cardiovascular:      Rate and Rhythm: Normal rate and regular rhythm.      Pulses: Normal pulses.      Heart sounds: Normal heart sounds. No murmur heard.  Pulmonary:      Effort: Pulmonary effort is normal. No respiratory distress.      Breath sounds: Normal breath sounds. No wheezing or rales.   Abdominal:      General: Abdomen is flat. There is no distension.      Palpations: Abdomen is soft.      Tenderness: There is no abdominal tenderness.   Musculoskeletal:         General: No tenderness.      Right lower leg: No edema.      Left lower leg: No edema.   Skin:     General: Skin is warm and dry.      Capillary Refill: Capillary refill takes less than 2 seconds.      Findings: No rash.   Neurological:      General: No focal deficit present.      Mental Status: She is alert and oriented to person, place, and time. Mental status is at baseline.   Psychiatric:         Mood and Affect: Mood normal.         Behavior: Behavior normal.         Thought Content: Thought content normal.         Judgment:  Judgment normal.          Last Recorded Vitals  /79   Pulse 77   Temp 36.3 °C (97.3 °F)   Resp 18   Wt 66.2 kg (146 lb)   SpO2 97%     Relevant Results        Results for orders placed or performed during the hospital encounter of 12/11/24 (from the past 24 hours)   CBC and Auto Differential   Result Value Ref Range    WBC 10.0 4.4 - 11.3 x10*3/uL    nRBC 0.0 0.0 - 0.0 /100 WBCs    RBC 5.01 4.00 - 5.20 x10*6/uL    Hemoglobin 15.5 12.0 - 16.0 g/dL    Hematocrit 45.6 36.0 - 46.0 %    MCV 91 80 - 100 fL    MCH 30.9 26.0 - 34.0 pg    MCHC 34.0 32.0 - 36.0 g/dL    RDW 12.7 11.5 - 14.5 %    Platelets 288 150 - 450 x10*3/uL    Neutrophils % 49.1 40.0 - 80.0 %    Immature Granulocytes %, Automated 0.1 0.0 - 0.9 %    Lymphocytes % 38.2 13.0 - 44.0 %    Monocytes % 10.4 2.0 - 10.0 %    Eosinophils % 1.9 0.0 - 6.0 %    Basophils % 0.3 0.0 - 2.0 %    Neutrophils Absolute 4.92 1.20 - 7.70 x10*3/uL    Immature Granulocytes Absolute, Automated 0.01 0.00 - 0.70 x10*3/uL    Lymphocytes Absolute 3.82 1.20 - 4.80 x10*3/uL    Monocytes Absolute 1.04 (H) 0.10 - 1.00 x10*3/uL    Eosinophils Absolute 0.19 0.00 - 0.70 x10*3/uL    Basophils Absolute 0.03 0.00 - 0.10 x10*3/uL   Magnesium   Result Value Ref Range    Magnesium 1.97 1.60 - 2.40 mg/dL   Comprehensive metabolic panel   Result Value Ref Range    Glucose 112 (H) 74 - 99 mg/dL    Sodium 139 136 - 145 mmol/L    Potassium 3.7 3.5 - 5.3 mmol/L    Chloride 101 98 - 107 mmol/L    Bicarbonate 27 21 - 32 mmol/L    Anion Gap 15 10 - 20 mmol/L    Urea Nitrogen 16 6 - 23 mg/dL    Creatinine 0.70 0.50 - 1.05 mg/dL    eGFR >90 >60 mL/min/1.73m*2    Calcium 10.1 8.6 - 10.3 mg/dL    Albumin 4.3 3.4 - 5.0 g/dL    Alkaline Phosphatase 78 33 - 136 U/L    Total Protein 6.7 6.4 - 8.2 g/dL    AST 26 9 - 39 U/L    Bilirubin, Total 0.5 0.0 - 1.2 mg/dL    ALT 22 7 - 45 U/L   Lipase   Result Value Ref Range    Lipase 18 9 - 82 U/L   Troponin I, High Sensitivity, Initial   Result Value Ref Range     Troponin I, High Sensitivity 4 0 - 13 ng/L   Troponin, High Sensitivity, 1 Hour   Result Value Ref Range    Troponin I, High Sensitivity 3 0 - 13 ng/L       CT angio chest abdomen pelvis    Result Date: 12/11/2024  Interpreted By:  Veronica Cordova, STUDY: CT ANGIO CHEST ABDOMEN PELVIS;  12/11/2024 4:33 am   INDICATION: Signs/Symptoms:back pain, chest pain.   COMPARISON: Chest CT 11/07/2022   ACCESSION NUMBER(S): [id number]   ORDERING CLINICIAN: CECI MINOR   TECHNIQUE: Axial CT images of the chest, abdomen and pelvis  before and after intravenous administration of contrast using CT angiographic technique. Coronal and sagittal images are reconstructed.  3D reconstructions were obtained at a separate workstation.   FINDINGS: VASCULAR: THORACIC AORTA: Initial noncontrast images demonstrate no aortic intramural hematoma. No aortic aneurysm or dissection. The great vessel origins are patent with no significant stenosis.   ABDOMINAL AORTA: No aortic aneurysm or dissection. No significant stenosis of the celiac artery. There is mild stenosis of the proximal superior mesenteric artery. The accessory right renal artery is noted. No significant stenosis of the renal arteries or inferior mesenteric artery.   The bilateral common iliac, internal iliac, external iliac and common femoral arteries are patent with no significant stenosis.   No pulmonary embolism.   CHEST:   HEART: Normal size.  No pericardial effusion. MEDIASTINUM AND LISHA: No pathologically enlarged thoracic lymph nodes. Small hiatal hernia. LUNG, PLEURA, LARGE AIRWAYS: No pleural effusion or pneumothorax. No pulmonary consolidation or suspicious pulmonary nodule. Stable 6 mm right lower lobe pulmonary nodule. CHEST WALL AND LOWER NECK: Within normal limits.   ABDOMEN:   BONES: No acute osseous abnormality. Mild multilevel degenerative disc changes. ABDOMINAL WALL: Within normal limits.   LIVER: Within normal limits. BILE DUCTS: No biliary  dilatation. GALLBLADDER: Cholecystectomy. PANCREAS: Within normal limits. SPLEEN: Within normal limits. ADRENALS:  Within normal limits. KIDNEYS: Symmetric renal enhancement. No hydronephrosis or perinephric fluid collection. Bilateral nonobstructing renal calculi measuring 3 mm or less. URETERS: No hydroureter.   PELVIS:   REPRODUCTIVE ORGANS: No pelvic masses. BLADDER: Within normal limits.   RETROPERITONEUM: Within normal limits. BOWEL: No dilated bowel.  Normal appendix. PERITONEUM: No ascites or free air, no fluid collection.       No aortic aneurysm or dissection.   No acute abnormality of the chest, abdomen and pelvis.       MACRO: None   Signed by: Veronica Cordova 12/11/2024 5:29 AM Dictation workstation:   [id number]        Assessment/Plan     Chest Pain  -Increasing frequency of episodes throughout day may be indicative of unstable angina  -HEART score is 4, TOMMIE score 1  -Will cycle a third troponin at 0800  -Received aspirin 324 mg by EMS, continue daily dosing  -Check echocardiogram  -Consult cardiology for evaluation  -Check FLP and A1c  -Will keep n.p.o. for now in case stress test is entertained later today based on clinical course    Hyperlipidemia  -Remote history of statin intolerance, unsure which specific statin she was taking  -Check FLP as noted above    Hyperglycemia  -Check A1c to ensure no evidence of diabetes    Pulmonary Nodule  -Stable from imaging in 2022  -Lifelong non-smoker, no further eval based on Fleischner criteria required unless symptomatic    Hypothyroidism  -Continue home levothyroxine           Baljeet Melara MD

## 2024-12-11 NOTE — ED PROVIDER NOTES
HPI   Chief Complaint   Patient presents with    Chest Pain     Pt c/o mid chest pain that radiated to her back that started around 1 am. Pt given aspirin and Zofran in the squad.       64-year-old female here for chief complaint of chest pain and back pain by EMS.  Patient states she had some epigastric pain earlier today and some nausea.  She almost vomited but did not.  She ate some food at the mall.  She then had chest pain radiating into the back tonight.  She was not asleep she states she could not sleep but she was not sure why.  She rated the pain a 10 out of 10 it is now 1 out of 10.  She was given 4 aspirin by the squad and Zofran.  She feels much better.                  Patient History   Past Medical History:   Diagnosis Date    Acute bronchitis due to other specified organisms 02/28/2020    Acute bronchitis due to other specified organisms    Acute recurrent maxillary sinusitis 09/22/2015    Acute recurrent maxillary sinusitis    Acute upper respiratory infection, unspecified 01/06/2017    Viral URI    Cellulitis of left upper limb 04/15/2021    Cellulitis of left upper extremity    Other conditions influencing health status 01/06/2017    History of cough    Personal history of diseases of the skin and subcutaneous tissue 05/29/2014    History of folliculitis    Personal history of other diseases of the respiratory system 11/26/2014    History of acute sinusitis    Personal history of other diseases of the respiratory system 09/09/2016    History of sinusitis    Personal history of other diseases of the respiratory system 02/18/2020    History of influenza    Personal history of other endocrine, nutritional and metabolic disease     History of hypothyroidism    Personal history of other specified conditions 09/12/2016    History of vertigo    Prediabetes 10/22/2022    Prediabetes    Unspecified symptoms and signs involving the genitourinary system 06/06/2018    UTI symptoms    Varicella without  complication     Varicella without complication     Past Surgical History:   Procedure Laterality Date     SECTION, CLASSIC  2015     Section    CHOLECYSTECTOMY  10/13/2017    Cholecystectomy    COLONOSCOPY  2015    Complete Colonoscopy     Family History   Problem Relation Name Age of Onset    Other (cardiac disorder) Father       Social History     Tobacco Use    Smoking status: Former     Types: Cigarettes    Smokeless tobacco: Never   Vaping Use    Vaping status: Never Used   Substance Use Topics    Alcohol use: Yes     Comment: occ    Drug use: Never       Physical Exam   ED Triage Vitals   Temp Pulse Resp BP   -- -- -- --      SpO2 Temp src Heart Rate Source Patient Position   -- -- -- --      BP Location FiO2 (%)     -- --       Physical Exam  Vitals and nursing note reviewed.   Constitutional:       Appearance: Normal appearance.   HENT:      Head: Normocephalic and atraumatic.      Nose: Nose normal.      Mouth/Throat:      Mouth: Mucous membranes are moist.   Eyes:      Extraocular Movements: Extraocular movements intact.      Pupils: Pupils are equal, round, and reactive to light.   Cardiovascular:      Rate and Rhythm: Normal rate and regular rhythm.   Pulmonary:      Effort: Pulmonary effort is normal.      Breath sounds: Normal breath sounds.   Abdominal:      General: Abdomen is flat.      Palpations: Abdomen is soft.   Musculoskeletal:         General: Normal range of motion.      Cervical back: Normal range of motion.   Skin:     General: Skin is warm and dry.      Capillary Refill: Capillary refill takes less than 2 seconds.   Neurological:      General: No focal deficit present.      Mental Status: She is alert.   Psychiatric:         Mood and Affect: Mood normal.           ED Course & MDM   ED Course as of 24 0553   Wed Dec 11, 2024   0314 Heart rate 70 EKG interpreted by me shows a heart rate of 71 normal sinus rhythm normal axis and normal intervals [TP]   0537  Second EKG interpreted by me shows a heart rate of 71 normal sinus rhythm with normal axis and intervals. [TP]      ED Course User Index  [TP] Meera Perez DO         Diagnoses as of 12/11/24 0553   Chest pain, unspecified type                 No data recorded     [place] Coma Scale Score: 15 (12/11/24 0355 : Neelam Gaona RN) HEART Score: 4 (12/11/24 0551 : Meera Perez DO)                         Medical Decision Making  Medical Decision Making: Patient had another episode of pain during the workup.  See ED course for second EKG.  Troponins are normal CT chest abdomen pelvis are unremarkable.  Lab work is also unremarkable.  Patient has risk factors with a heart score 4.  At this time I feel she needs cardiac workup.  She agrees and will be hospitalized for further management and treatment.  Differential includes coronary artery vasospasm STEMI NSTEMI chest wall pain gallstones pancreatitis  Consider echo  [unfilled]     Meera Perez D.O.  Emergency Medicine          Procedure  Procedures     Meera Perez DO  12/11/24 0553

## 2024-12-11 NOTE — PROGRESS NOTES
12/11/24 1302   Discharge Planning   Living Arrangements Spouse/significant other   Support Systems Spouse/significant other   Assistance Needed Alert and oriented x 3, Independent with ADL's, Drives, No DME   Type of Residence Private residence   Number of Stairs to Enter Residence 3   Number of Stairs Within Residence 24  (12 steps upstairs and 12 steps to basement)   Do you have animals or pets at home? No   Who is requesting discharge planning? Provider   Home or Post Acute Services None   Expected Discharge Disposition Home   Does the patient need discharge transport arranged? No   Financial Resource Strain   How hard is it for you to pay for the very basics like food, housing, medical care, and heating? Not hard   Housing Stability   In the last 12 months, was there a time when you were not able to pay the mortgage or rent on time? N   In the past 12 months, how many times have you moved where you were living? 0   At any time in the past 12 months, were you homeless or living in a shelter (including now)? N   Transportation Needs   In the past 12 months, has lack of transportation kept you from medical appointments or from getting medications? no   In the past 12 months, has lack of transportation kept you from meetings, work, or from getting things needed for daily living? No   Patient Choice   Provider Choice list and CMS website (https://medicare.gov/care-compare#search) for post-acute Quality and Resource Measure Data were provided and reviewed with: Patient;Family   Patient / Family choosing to utilize agency / facility established prior to hospitalization No   Stroke Family Assessment   Stroke Family Assessment Needed No   Intensity of Service   Intensity of Service 0-30 min

## 2024-12-11 NOTE — DISCHARGE SUMMARY
Discharge Diagnosis  Chest pain, atypical, likely GERD    Issues Requiring Follow-Up  PCP follow up in 1 week, consider starting statin  She did not want to try PPI but will follow up with PCP to discuss seeing GI    Discharge Meds     Medication List      CONTINUE taking these medications     cyanocobalamin 1,000 mcg tablet; Commonly known as: Vitamin B-12   estradiol 10 mcg tablet vaginal tablet; Commonly known as: Vagifem   levothyroxine 112 mcg tablet; Commonly known as: Synthroid, Levoxyl;   Take 1 tablet by mouth once daily   multivitamin tablet   NON FORMULARY     STOP taking these medications     ibandronate 150 mg tablet; Commonly known as: Boniva       Test Results Pending At Discharge  Pending Labs       Order Current Status    Hemoglobin A1c In process            Hospital Course   Per HPI:  Neelam Garsia is a 64 y.o. female with a history of hyperlipidemia, hypothyroidism, congenital duplication cyst of esophagus, GERD, osteoporosis, and hiatal hernia. She presented to the emergency department early on December 11 complaining of chest pain radiating to the back.  She noted a self-limited episode around 1300 on December 10 with associated nausea that resolved without specific intervention.  She was sitting in her truck on the way to the mall and it came on suddenly. Evaluated by cardiology, stress test and echo were done and she was cleared for discharge and to follow up with PCP. Should consider starting treatment for elevated cholesterol, she reports being intolerant to statins. Follow up with PCP and possibly GI for likely reflux. On day of discharge, patient denied CP, SOB, n/v/diarrhea/constipation, was tolerating diet and ambulating without issue.  Patient appeared hemodynamically stable at time of discharge. Discussed with attending physician who agreed with discharge plan.  Time spent on discharge including patient evaluation, education, coordination of care with consultants, attending physician,  care coordination and nursing was 35 minutes.        Pertinent Physical Exam At Time of Discharge  Physical Exam  Physical Exam  Gen: NAD  Eyes:  EOM intact  ENT: MMM  Neck: No JVD  Respiratory: CTAB, no wheezes/rhonchi  Cardiac: RRR, no murmurs rubs or gallops  Abdomen: soft, NT, +BS  Extremities: no edema or cyanosis  Neuro: No focal deficits, alert and oriented x 3  Psych:  appropriate mood and behavior    Outpatient Follow-Up  No future appointments.      Alexandra Tong PA-C

## 2024-12-11 NOTE — NURSING NOTE
Discharge instructions reviewed with patient. No questions at this time. Education given for new homegoing medications with type, uses, and most common side effects. Patient verbalized understanding. Handout on chest given. Telemetry removed and left at nurses station. IV removed. Discharged home in stable condition.

## 2024-12-11 NOTE — PROGRESS NOTES
Neelam Garsia is a 64 y.o. female on day 0 of admission presenting with Chest pain.      Subjective   Denies any further CP or back pain, denies SOB, n/v/d/c. States her dad had a heart attack around her age and she has not had any cardiac testing done.       Objective     Last Recorded Vitals  /73 (BP Location: Right arm, Patient Position: Lying)   Pulse 74   Temp 36.5 °C (97.7 °F) (Temporal)   Resp 18   Wt 66.5 kg (146 lb 9.6 oz)   SpO2 94%   Intake/Output last 3 Shifts:  No intake or output data in the 24 hours ending 12/11/24 1036    Admission Weight  Weight: 66.2 kg (146 lb) (12/11/24 0324)    Daily Weight  12/11/24 : 66.5 kg (146 lb 9.6 oz)    Image Results  ECG 12 lead  Normal sinus rhythm  Low voltage QRS  Cannot rule out Anterior infarct , age undetermined  Abnormal ECG  When compared with ECG of 11-DEC-2024 03:05, (unconfirmed)  No significant change was found  ECG 12 lead  Normal sinus rhythm  Low voltage QRS  Borderline ECG  When compared with ECG of 12-OCT-2021 14:15,  No significant change was found  CT angio chest abdomen pelvis  Narrative: Interpreted By:  Veronica Cordova,   STUDY:  CT ANGIO CHEST ABDOMEN PELVIS;  12/11/2024 4:33 am      INDICATION:  Signs/Symptoms:back pain, chest pain.      COMPARISON:  Chest CT 11/07/2022      ACCESSION NUMBER(S):  [id number]      ORDERING CLINICIAN:  CECI MINOR      TECHNIQUE:  Axial CT images of the chest, abdomen and pelvis  before and after  intravenous administration of contrast using CT angiographic  technique. Coronal and sagittal images are reconstructed.  3D  reconstructions were obtained at a separate workstation.      FINDINGS:  VASCULAR:  THORACIC AORTA: Initial noncontrast images demonstrate no aortic  intramural hematoma. No aortic aneurysm or dissection. The great  vessel origins are patent with no significant stenosis.      ABDOMINAL AORTA: No aortic aneurysm or dissection. No significant  stenosis of the celiac artery.  There is mild stenosis of the proximal  superior mesenteric artery. The accessory right renal artery is  noted. No significant stenosis of the renal arteries or inferior  mesenteric artery.      The bilateral common iliac, internal iliac, external iliac and common  femoral arteries are patent with no significant stenosis.      No pulmonary embolism.      CHEST:      HEART: Normal size.  No pericardial effusion.  MEDIASTINUM AND LISHA: No pathologically enlarged thoracic lymph  nodes. Small hiatal hernia. LUNG, PLEURA, LARGE AIRWAYS: No pleural  effusion or pneumothorax. No pulmonary consolidation or suspicious  pulmonary nodule. Stable 6 mm right lower lobe pulmonary nodule.  CHEST WALL AND LOWER NECK: Within normal limits.      ABDOMEN:      BONES: No acute osseous abnormality. Mild multilevel degenerative  disc changes. ABDOMINAL WALL: Within normal limits.      LIVER: Within normal limits.  BILE DUCTS: No biliary dilatation.  GALLBLADDER: Cholecystectomy.  PANCREAS: Within normal limits.  SPLEEN: Within normal limits.  ADRENALS:  Within normal limits.  KIDNEYS: Symmetric renal enhancement. No hydronephrosis or  perinephric fluid collection. Bilateral nonobstructing renal calculi  measuring 3 mm or less. URETERS: No hydroureter.      PELVIS:      REPRODUCTIVE ORGANS: No pelvic masses.  BLADDER: Within normal limits.      RETROPERITONEUM: Within normal limits.  BOWEL: No dilated bowel.  Normal appendix.  PERITONEUM: No ascites or free air, no fluid collection.      Impression: No aortic aneurysm or dissection.      No acute abnormality of the chest, abdomen and pelvis.              MACRO:  None      Signed by: Veronica Cordova 12/11/2024 5:29 AM  Dictation workstation:   [id number]      Physical Exam  Physical Exam  Gen: NAD  Eyes:  EOM intact  ENT: MMM  Neck: No JVD  Respiratory: CTAB, no wheezes/rhonchi  Cardiac: RRR, no murmurs rubs or gallops, nontender over chest wall  Abdomen: soft, NT, +BS  Extremities: no  edema or cyanosis  Neuro: No focal deficits, alert and oriented x 3  Psych:  appropriate mood and behavior      Assessment/Plan      Chest Pain  -Increasing frequency of episodes throughout day may be indicative of unstable angina, has positive FH  -trops neg  -Received aspirin 324 mg by EMS, continue daily dosing  -Check echocardiogram  -Consult cardiology for evaluation  -Check FLP and A1c  -Will keep n.p.o. for now in case stress test is entertained later today based on clinical course     Hyperlipidemia  -start statin  -       Hyperglycemia  -A1C 4.9 in 2022  -Check A1c to ensure no evidence of diabetes     Pulmonary Nodule  -Stable from imaging in 2022  -Follow up with PCP to monitor     Hypothyroidism  -Continue home levothyroxine  -TSH WNL     DVT prophy  -lovenox    Dispo: NPO for stress test, echo read pending, dc pending results  D/w Dr. Tommy Tong, PA-C

## 2024-12-11 NOTE — CARE PLAN
The patient's goals for the shift include      The clinical goals for the shift include pt will remain free of chest pain      Problem: Pain  Goal: Takes deep breaths with improved pain control throughout the shift  12/11/2024 1222 by Denise Quevedo RN  Outcome: Progressing  12/11/2024 1221 by Denise Quevedo RN  Outcome: Progressing     Problem: Pain  Goal: Turns in bed with improved pain control throughout the shift  12/11/2024 1222 by Denise Quevedo RN  Outcome: Progressing  12/11/2024 1221 by Denise Quevedo RN  Outcome: Progressing     Problem: Pain  Goal: Walks with improved pain control throughout the shift  12/11/2024 1222 by Denise Quevedo RN  Outcome: Progressing  12/11/2024 1221 by Denise Quevedo RN  Outcome: Progressing     Problem: Pain  Goal: Performs ADL's with improved pain control throughout shift  12/11/2024 1222 by Denise Quevedo RN  Outcome: Progressing  12/11/2024 1221 by Denise Quevedo RN  Outcome: Progressing     Problem: Pain  Goal: Participates in PT with improved pain control throughout the shift  12/11/2024 1222 by Denise Quevedo RN  Outcome: Progressing  12/11/2024 1221 by Denise Quevedo RN  Outcome: Progressing     Problem: Pain  Goal: Free from opioid side effects throughout the shift  12/11/2024 1222 by Denise Quevedo RN  Outcome: Progressing  12/11/2024 1221 by Denise Quevedo RN  Outcome: Progressing

## 2024-12-11 NOTE — CARE PLAN
The patient's goals for the shift include      The clinical goals for the shift include pt will remain free of chest pain      Problem: Pain  Goal: Takes deep breaths with improved pain control throughout the shift  Outcome: Progressing     Problem: Pain  Goal: Turns in bed with improved pain control throughout the shift  Outcome: Progressing     Problem: Pain  Goal: Walks with improved pain control throughout the shift  Outcome: Progressing     Problem: Pain  Goal: Performs ADL's with improved pain control throughout shift  Outcome: Progressing     Problem: Pain  Goal: Participates in PT with improved pain control throughout the shift  Outcome: Progressing     Problem: Pain  Goal: Free from opioid side effects throughout the shift  Outcome: Progressing     Problem: Pain  Goal: Free from acute confusion related to pain meds throughout the shift  Outcome: Progressing

## 2024-12-12 ENCOUNTER — PATIENT OUTREACH (OUTPATIENT)
Dept: PRIMARY CARE | Facility: CLINIC | Age: 64
End: 2024-12-12
Payer: COMMERCIAL

## 2024-12-12 NOTE — PROGRESS NOTES
Discharge Facility: [facility], OBS  Discharge Diagnosis: Chest pain, atypical, likely GERD   Admission Date: 12/11/2024  Discharge Date: 12/11/2024    PCP Appointment Date: Message to office to schedule   Specialist Appointment Date: 5/16/2025 10:30 AM Dr. Christina Bill, Gastroenterology [facility]  Hospital Encounter and Summary Linked: Yes  See discharge assessment below for further details    Medications  Medications reviewed with patient/caregiver?: Yes (12/12/2024  1:21 PM)  Does the patient have all medications ordered at discharge?: No (12/12/2024  1:21 PM)  What is keeping the patient from filling the prescriptions?: Patient desires to consult PCP (12/12/2024  1:21 PM)  Prescription Comments: NEW: atorvastatin prescribed and PPI recommnended (12/12/2024  1:21 PM)  Is the patient taking all medications as directed (includes completed medication regime)?: No (12/12/2024  1:21 PM)  What is preventing the patient from taking all medications as directed?: Desires to consult PCP first (12/12/2024  1:21 PM)  Care Management Interventions: Provided patient education (12/12/2024  1:21 PM)  Medication Comments: Message sent to office requesting hospital follow up to discuss medications. (12/12/2024  1:21 PM)    Appointments  Does the patient have a primary care provider?: Yes (12/12/2024  1:21 PM)  Has the patient kept scheduled appointments due by today?: Not applicable (12/12/2024  1:21 PM)  Care Management Interventions: Advised to schedule with specialist (Provided office phone number for Dr. Christina Bill so patient can be put on a cancellation list. Current NPV has been scheduled for May 2025.) (12/12/2024  1:21 PM)    Self Management  What is the home health agency?: N/A (12/12/2024  1:21 PM)  What Durable Medical Equipment (DME) was ordered?: N/A (12/12/2024  1:21 PM)    Patient Teaching  Does the patient have access to their discharge instructions?: Yes (12/12/2024  1:21 PM)  Care Management  Interventions: Reviewed instructions with patient (12/12/2024  1:21 PM)  What is the patient's perception of their health status since discharge?: Returned to baseline/stable (12/12/2024  1:21 PM)  Is the patient/caregiver able to teach back the hierarchy of who to call/visit for symptoms/problems? PCP, Specialist, Home Health nurse, Urgent Care, ED, 911: Yes (12/12/2024  1:21 PM)  Patient/Caregiver Education Comments: Patient verbalized understanding of discharge instructions. Provided contact information for nonurgent questions or concerns. (12/12/2024  1:21 PM)    Wrap Up  Wrap Up Additional Comments: 64yoF with PMHx of hyperlipidemia, hypothyroidism, congenital duplication cyst of esophagus, GERD, osteoporosis, and hiatal hernia presented with c/o chest pain radiating to the back. Cardiology consulted and ACS ruled out. Symptoms thought to be GI. PPI recommended and GI referral made. Patient hesistant to take PPI because of osteoporosis. Also prescribed to take atorvastatin, but had a previous reaction and wants to discuss with PCP. GI follow up currently scheduled for May 2025. (12/12/2024  1:21 PM)

## 2024-12-15 LAB
ATRIAL RATE: 71 BPM
ATRIAL RATE: 71 BPM
P AXIS: 34 DEGREES
P AXIS: 37 DEGREES
P OFFSET: 179 MS
P OFFSET: 182 MS
P ONSET: 132 MS
P ONSET: 138 MS
PR INTERVAL: 166 MS
PR INTERVAL: 174 MS
Q ONSET: 219 MS
Q ONSET: 221 MS
QRS COUNT: 11 BEATS
QRS COUNT: 12 BEATS
QRS DURATION: 76 MS
QRS DURATION: 80 MS
QT INTERVAL: 398 MS
QT INTERVAL: 402 MS
QTC CALCULATION(BAZETT): 432 MS
QTC CALCULATION(BAZETT): 436 MS
QTC FREDERICIA: 421 MS
QTC FREDERICIA: 425 MS
R AXIS: 15 DEGREES
R AXIS: 20 DEGREES
T AXIS: 31 DEGREES
T AXIS: 32 DEGREES
T OFFSET: 418 MS
T OFFSET: 422 MS
VENTRICULAR RATE: 71 BPM
VENTRICULAR RATE: 71 BPM

## 2024-12-18 ENCOUNTER — APPOINTMENT (OUTPATIENT)
Dept: PRIMARY CARE | Facility: CLINIC | Age: 64
End: 2024-12-18
Payer: COMMERCIAL

## 2024-12-18 DIAGNOSIS — R07.89 ATYPICAL CHEST PAIN: ICD-10-CM

## 2024-12-18 DIAGNOSIS — K21.00 GASTROESOPHAGEAL REFLUX DISEASE WITH ESOPHAGITIS WITHOUT HEMORRHAGE: Primary | ICD-10-CM

## 2024-12-18 DIAGNOSIS — M76.32 ILIOTIBIAL BAND SYNDROME OF LEFT SIDE: ICD-10-CM

## 2024-12-18 PROCEDURE — 99442 PR PHYS/QHP TELEPHONE EVALUATION 11-20 MIN: CPT | Performed by: FAMILY MEDICINE

## 2024-12-18 PROCEDURE — 1036F TOBACCO NON-USER: CPT | Performed by: FAMILY MEDICINE

## 2024-12-18 RX ORDER — OMEPRAZOLE 20 MG/1
20 CAPSULE, DELAYED RELEASE ORAL DAILY
Qty: 30 CAPSULE | Refills: 2 | Status: SHIPPED | OUTPATIENT
Start: 2024-12-18 | End: 2025-03-18

## 2024-12-18 NOTE — PROGRESS NOTES
Subjective   Patient ID: Neelam Garsia is a 64 y.o. female who presents for Rash and Hospital Follow-up.    HPI     Virtual or Telephone Consent    A telephone visit (audio only) between the patient (at the originating site) and the provider (at the distant site) was utilized to provide this telehealth service.   Verbal consent was requested and obtained from Neelam Garsia on this date, 12/18/24 for a telehealth visit.      Could not get a ride today     Was having chest pains, shaky and sweaty -   Lasted 5 min  -   Thought it may be GERD    1 AM  - had it again -   Called 911 -   Was admitted for chest pain   Cardiac work up WNL   Stress test negative     Lipid profile  - improved greatly   LDL down to 154   Does not want to take atorvastatin   Working on better diet and exercise     Labs reviewed WNL       Pain outer aspect left leg  - about mid thigh  Has been a while   Not constant    - hurts to touch  -    - heat helped a bit    - had been weight lifting       Review of Systems    Objective   There were no vitals taken for this visit.    Physical Exam    NAD     Assessment/Plan   Problem List Items Addressed This Visit    None  Visit Diagnoses         Codes    Gastroesophageal reflux disease with esophagitis without hemorrhage    -  Primary K21.00    Atypical chest pain     R07.89    Iliotibial band syndrome of left side     M76.32          Discussed pains likely GERD -   Try omeprazole daily for a few months     Discussed pain on leg could be IT band  -   Discussed rest, ice and can look up IT band exercises

## 2024-12-18 NOTE — PATIENT INSTRUCTIONS
Try the omeprazole 20 mg daily for 2- 3 months - see if that helps the chest pains -   If not  - I need to know    For the leg -   Try rest  Ice or heat - whichever helps more   Look up IT band exercises on You Tube

## 2025-01-03 ENCOUNTER — PATIENT OUTREACH (OUTPATIENT)
Dept: PRIMARY CARE | Facility: CLINIC | Age: 65
End: 2025-01-03
Payer: COMMERCIAL

## 2025-01-03 NOTE — PROGRESS NOTES
Call regarding appt with PCP on Dr. Del Cid after hospitalization. At time of outreach call, the patient stated she is doing much better and has not had any further issues. Believes omeprazole is helping.

## 2025-01-21 DIAGNOSIS — E03.9 HYPOTHYROIDISM, UNSPECIFIED TYPE: ICD-10-CM

## 2025-01-21 RX ORDER — LEVOTHYROXINE SODIUM 112 UG/1
TABLET ORAL
Qty: 90 TABLET | Refills: 0 | Status: SHIPPED | OUTPATIENT
Start: 2025-01-21

## 2025-01-31 ENCOUNTER — PATIENT OUTREACH (OUTPATIENT)
Dept: PRIMARY CARE | Facility: CLINIC | Age: 65
End: 2025-01-31
Payer: COMMERCIAL

## 2025-03-11 ENCOUNTER — PATIENT OUTREACH (OUTPATIENT)
Dept: PRIMARY CARE | Facility: CLINIC | Age: 65
End: 2025-03-11
Payer: COMMERCIAL

## 2025-04-22 DIAGNOSIS — E03.9 HYPOTHYROIDISM, UNSPECIFIED TYPE: ICD-10-CM

## 2025-04-23 RX ORDER — LEVOTHYROXINE SODIUM 112 UG/1
112 TABLET ORAL DAILY
Qty: 90 TABLET | Refills: 0 | Status: SHIPPED | OUTPATIENT
Start: 2025-04-23

## 2025-05-13 ASSESSMENT — PROMIS GLOBAL HEALTH SCALE
CARRYOUT_SOCIAL_ACTIVITIES: VERY GOOD
RATE_QUALITY_OF_LIFE: VERY GOOD
RATE_SOCIAL_SATISFACTION: EXCELLENT
RATE_PHYSICAL_HEALTH: VERY GOOD
RATE_AVERAGE_FATIGUE: MILD
RATE_AVERAGE_PAIN: 3
RATE_MENTAL_HEALTH: VERY GOOD
RATE_GENERAL_HEALTH: VERY GOOD
EMOTIONAL_PROBLEMS: NEVER
CARRYOUT_PHYSICAL_ACTIVITIES: MOSTLY

## 2025-05-14 ENCOUNTER — APPOINTMENT (OUTPATIENT)
Dept: PRIMARY CARE | Facility: CLINIC | Age: 65
End: 2025-05-14
Payer: COMMERCIAL

## 2025-05-14 VITALS
HEART RATE: 83 BPM | SYSTOLIC BLOOD PRESSURE: 112 MMHG | OXYGEN SATURATION: 96 % | WEIGHT: 139 LBS | BODY MASS INDEX: 26.26 KG/M2 | DIASTOLIC BLOOD PRESSURE: 80 MMHG

## 2025-05-14 DIAGNOSIS — Z00.00 WELLNESS EXAMINATION: Primary | ICD-10-CM

## 2025-05-14 DIAGNOSIS — E03.9 ACQUIRED HYPOTHYROIDISM: ICD-10-CM

## 2025-05-14 DIAGNOSIS — M76.32 IT BAND SYNDROME, LEFT: ICD-10-CM

## 2025-05-14 PROCEDURE — 99396 PREV VISIT EST AGE 40-64: CPT | Performed by: FAMILY MEDICINE

## 2025-05-14 PROCEDURE — 1036F TOBACCO NON-USER: CPT | Performed by: FAMILY MEDICINE

## 2025-05-14 ASSESSMENT — PATIENT HEALTH QUESTIONNAIRE - PHQ9
2. FEELING DOWN, DEPRESSED OR HOPELESS: NOT AT ALL
SUM OF ALL RESPONSES TO PHQ9 QUESTIONS 1 AND 2: 0
1. LITTLE INTEREST OR PLEASURE IN DOING THINGS: NOT AT ALL

## 2025-05-14 NOTE — PROGRESS NOTES
Subjective   Patient ID: Neelam Garsia is a 65 y.o. female who presents for Follow-up (Yearly check up/wellness.).    HPI     LOV 2/2024  - wellness and check up   12/2024 - Tele appt - chest pains -   Was in hospital - cardiac work up WNL   Had her start PPI again         Updates and Concerns:       Still having fun with grand-babies.       Saw Rheum -   Suggested a rowing machine -   Then her disc in her neck flared up   With rest it resolved.       Still with pain outer left thigh -   Worse when she gets up after sitting  or in the AM  -   Loosens up with walking  -  - most of the time pain down to the calf     Took omeprazole for a month -   Fine now       Chronic issues reviwed today:     HYPOTHRYOID - due for TSH check   Diagnosed about 2008   On levo 112 mcg,   Takes med regularly.     Osteoporosis -    Alendronate was started 12/2021   Stopped alendronate 2023 due to side effects   Last DEXA  3/2024   Did see Rheum - DR Garcia - has appt in NOV       Hyperlipidemia -   Tends to run high, was on statin in the past she did not tolerate   2018  HDL 39.6   2019  HDL 40    2020  HDL 38    2022  HDL 35.5     2023     HDL  43      2024 TC  214  HDL 33.9  LDL  154       Not exercising lately due to the above issues  Eating the same     Fasting today       [place]  -       Sees [facility]yn [facility] -   Due in June   Last pap -   LMP - periods stopped about age 53  On vagifem tabs     Last mammogram - 7/2024   Breast issues? - none  No BRCA in family      Bone density  Last DEXA - 12/2021 - osteoporosis -   ON alendronate since 12/2021  - stopped in 2023   fracture history - NONE  Ca/Vit D: she thinks ok , on Vit D      Last colonoscopy - 11/4/ 2022-  WNL      Last cholesterol - When she exercises and takes eat better - chol improves      Last blood sugar - 2018 95     2019 102      2020 103 A1C 5.3% - trying to stop pepsi      Need coronary calcium score?  "- 2/2020 - low Coronary CT score - 39           Hep C screen? - NEG 2019   HIV screen? - LOW RISK      vaccines - CURRENT INSURANCE DOES NOT COVER WELL   Flu - did not get one for 2 years   Pneumonia  - plan when on medicare   RSV - not yet   COVID -  had primary series x 2   Tdap -   Shingrix - did not get those      vision -  last year       dentist - goes regularly     BMI/weight - 28   nutrition -\"not horrible\"   exercise -   5 - 6 days a week - 60 - 90 min      depression - she feels its ok      sleep - DOING WELL  - has nights when     smoking status - smoked a small amout for 2 years - years ago   Need Screening Lung CT? -see above      alcohol consumption - occas. weekend - MORE RARE NOW       LIVING WILL/MEDICAL POA - DOES NOT HAVE THEM - would like them       Review of Systems    Objective   /80 (BP Location: Left arm, Patient Position: Sitting, BP Cuff Size: Large adult)   Pulse 83   Wt 63 kg (139 lb)   SpO2 96%   BMI 26.26 kg/m²     Physical Exam  Vitals reviewed.   Constitutional:       General: She is not in acute distress.     Appearance: Normal appearance. She is not ill-appearing, toxic-appearing or diaphoretic.   HENT:      Head: Normocephalic and atraumatic.      Right Ear: Tympanic membrane, ear canal and external ear normal.      Left Ear: Tympanic membrane, ear canal and external ear normal.      Nose: Nose normal.      Mouth/Throat:      Mouth: Mucous membranes are moist.      Pharynx: No posterior oropharyngeal erythema.   Eyes:      General: No scleral icterus.     Extraocular Movements: Extraocular movements intact.      Pupils: Pupils are equal, round, and reactive to light.   Cardiovascular:      Rate and Rhythm: Normal rate and regular rhythm.      Pulses: Normal pulses.      Heart sounds: No murmur heard.  Pulmonary:      Effort: Pulmonary effort is normal. No respiratory distress.      Breath sounds: Normal breath sounds. No wheezing.   Abdominal:      General: Bowel sounds " are normal. There is no distension.      Palpations: Abdomen is soft.      Tenderness: There is no abdominal tenderness.   Musculoskeletal:         General: Normal range of motion.      Cervical back: Neck supple. No rigidity.      Right lower leg: No edema.      Left lower leg: No edema.      Comments: NEG SLR    Lymphadenopathy:      Cervical: No cervical adenopathy.   Skin:     General: Skin is warm and dry.      Findings: No rash.   Neurological:      General: No focal deficit present.      Mental Status: She is alert and oriented to person, place, and time.   Psychiatric:         Mood and Affect: Mood normal.         Thought Content: Thought content normal.         Assessment & Plan  Wellness examination    GENERAL WELLNESS -   Wants to wait for Medicare for vaccines   Sees gyn   It band syndrome, left    Orders:    Referral to Physical Therapy; Future  gave referral for closer to home  To look up exercises on You tube   Acquired hypothyroidism    Orders:    TSH  refill when back          We discussed at visit any disease processes that were of concern as well as the risks, benefits and instructions of any new medication provided.    See orders and discussion section for information handed to patient on their Clinical Summary.   Patient (and/or caretaker of patient if present)  stated all questions were answered, and they voiced understanding of instructions.

## 2025-05-14 NOTE — PATIENT INSTRUCTIONS
Try the Mediterranean diet for your cholesterol       Look up IT Band syndrome exercises on YouTube   Can try aleve twice a day with food      When you have your living will and medical poa papers done - bring copies in       TAKING THYROID MEDICATION     It's very important that you take your thyroid medication first thing in the morning on an empty stomach.   You should only take it with water.  You should wait at least an hour before eating or drinking anything else.   Do not eat or drink (or take pills) that have iron or calcium in them for at least 3 hours after taking your thyroid medication.  Iron and calcium significantly effect the absorption of the medicine.     Try very hard to remember to take your pill every day.    Even missing one dose can effect your thyroid levels.   If you miss doses, its important to tell your provider when you are having your blood drawn.     If we adjust your dose of your medication, its very important to be sure you have a recheck of your thyroid level about 6 weeks after that adjustment.    This might need to be an office visit, or just a lab appointment - discuss that with your provider to see which they prefer.      Thyroid medication is most often a lifelong medication.  Never stop taking your thyroid medication unless your provider tells you to do so.           WELL VISIT/PREVENTATIVE VISIT INSTRUCTIONS:        Call [phone] to schedule any testing ordered at [facility].      For a mammogram, call   [phone] .    Please work on healthy nutrition,  optimal sleep, and regular exercise to feel better.    If you smoke - please quit, and if you drink alcohol, please limit your intake.       Be sure to ask me about any vaccines you may be due for,  and ask me any questions you may have about vaccines.   Generally -  a flu shot is recommended every fall for everyone over 6 months of age,  a pneumonia shot is recommended for anyone 65 and over or who has chronic  conditions such as diabetes, heart or lung disease,  the shingles vaccines are recommended for people age 50 and over,   a Tetnas/Pertussis booster is very 10 years (after the childhood set);  the RSV vaccine is for people age 65 and over,  and the COVID vaccines are recommended for everyone, but the boosters are often particular people, so ask me if you qualify.      There may be more vaccines you qualify for depending on your medical conditions, so be sure to ask me about that if you have any chronic illnesses.    Some people have insurance that will pay for the vaccines at the pharmacy, and some your doctors office - so be sure to check with your insurance about the best place to get your vaccines and your coverage of them.     Generally speaking,  good nutrition consists of lean meats, like chicken, fish and turkey (not fried);    plenty of fruits and vegetables (the fresher the better);   Less sugars, saturated fats, and processed foods - especially those made with white flour.   Try to eat the majority of your grain foods  as whole grains.   Keep an eye on your total calories in a day and serving sizes on packaging - this will help you limit your overall intake.    Remember, to lose weight (if you need to), your total calorie intake has to be about 300 - 500 calories less than what you burn in a day.   That number depends on your age, gender and body size.   Some people find a fitbit (calorie tracking device) helpful.      Please be sure to see the dentist every 6 months.    Please see the eye doctor no longer than every 2 years.    When you have your Living Will and Medical Power of  papers completed   (they have to be witnessed by 2 non-relatives or notarized to be legally binding),     please keep your originals in a safe place in your home, but make sure all your physicians have copies and that you take copies with you when you go to the hospital.        GETTING TEST RESULTS:    YOU WILL ALWAYS  "FIND OUT ABOUT ALL YOUR TEST RESULTS FROM ME.  I do not use the \"no news is good news\" policy.   The only time you would not, is if I have told you to get the testing done, and make a follow up appointment to go over it.    Then I will be waiting to talk to you at the visit about your test results.     I have a few different ways I get test results to you  (if its something urgent, we call you)  :       If you have a [employer] card -  I will have your test results on your secureach card within a week.  You should get a phone call telling you when the results are on the card, or just call the card in a week.   If two weeks go  by and you have not heard anything, call the card to see if the results are there,  and if not,  leave me a message.  If you are having trouble using [employer], they have a support line you should call :   [phone];   If you have lost your card, I need to know.     IT'S VERY IMPORTANT THAT YOU CALL TO LISTEN TO YOUR RESULTS, AS IT THEN LETS ME KNOW YOU GOT YOUR RESULTS.        If you get your test results on MY CHART,  you may commonly see your results even before I do.      I will always annotate a message on your results so you know that I saw them and how I feel about them.     If you need some help with MY CHART call the support number at [phone].    IF I mail your results to you,   I need to hear from you if you do not receive them within 2 weeks.      Be sure to let me know your preference for getting results.         BILLING FOR PREVENTATIVE VISITS.     Most insurance companies pay for a yearly \"Wellness Check\"  or they may call it a \"Preventative Physical\"   - but it's important to know what your plan covers ahead of the visit.    It's also a good idea to find out what sort of preventative testing they will cover for screening tests - like cholesterol, blood sugar, or colonoscopies. Also, find out which vaccines are covered and if you have any responsibility in paying " "for them.       If at your Wellness Visit,  we cover anything to do with problems/issues  you are having or  chronic medications/ medical conditions you have,   there will ALSO be a regular office charge that day.     Blood work  or testing obtained that has anything to do with an acute issue or chronic condition is billed under that diagnosis and not screening.       It's a good idea to find out from your insurance what is covered and what is your responsibility for all of the above possible charges.           Most importantly,   if you ever have any questions or concerns that cannot wait until your next visit with me,  you can message me through MY CHART or call the office and leave a voice mail message  (please allow for at least 24 hours for responses for these messages).       Take good care.   Dr Del Cid      For your bone health,  you want to be getting at least 3 servings of a high calcium food or drink every day.  For example:  1 cup of milk, 1 cup of yogurt, or 1 ounce of hard cheese   EACH  has 300 mg.  You can also find calcium in some non-dairy foods such as broccoli and almonds.  The daily goal  is 1000 - 1200 mg of TOTAL calcium intake a day.   If you are able,  you can easily find a list of high calcium foods on the Internet.      Also, most people need to take a Vitamin D 3 supplement,   For small children, the dose is 400 IU a day, and older children should have 800 IU a day.   Adults can be 1000 - 2000 IU a day, with some needing 5000 a day.   Be sure to verify  how much you should take.   LOOK FOR USP CERTIFIED vitamins.    This is a label you will find on the vitamins which verifies how good they are.         ABOUT MEDICARE PREVENTATIVE APPOINTMENTS:     YOUR YEARLY MEDICARE WELLNESS VISIT IS VERY IMPORTANT.      Medicare wants all of their patients to schedule their \"Welcome to Medicare\" visit  when you are in the first 12 months of being on Medicare.    Then every year after that, they want you " "to schedule your  \"Annual Wellness\"  visit.     These visits have a very specific list of topics I have to cover at the visit,  so you will have paperwork you need to complete before I see you.   Of interest,  there is NOT actually a physical exam as part of this visit.       If you are female,   a Well Woman Exam  (Breast exam and pelvic exam) - are paid for by Medicare every 2 years.   Mammograms are paid for every year.    If you are due for this exam too,  the Medicare wellness and the well woman exam can be done on the same day,  PLEASE TELL THIS TO  WHEN MAKING THE APPOINTMENT.      Some of the Medicare plans ALSO cover a traditional \"physical\" - which has more of the physical exam component.   You may want to find out if your insurance covers that too.       (this is  the code - 04646)  - That can be added to the visit as well.    You would need to tell us.     IT'S VERY HELPFUL IF YOU KNOW WHAT YOUR PLAN COVERS AHEAD OF THE VISIT.      Please check to see what your plan(s) cover.   (Even checking on testing and vaccines that are covered or not helps us greatly!)     At these appointments ,  IF  we cover any of your chronic medical conditions,  medical concerns,  or medications,  I will also be billing lorie  \"E/M  code\" which stands for \"Evaluation and Management\"    -  which is a regular office visit code.    THAT CHARGE MAY BE SUBJECT TO CO-PAYS AND DEDUCTIBLES.     PLEASE DO NOT \"SAVE\" ALL OF YOUR CONCERNS TO GO OVER AT THESE YEARLY WELLNESS VISITS.   YOU SHOULD BE SCHEDULING SEPARATE APPOINTMENTS WHEN YOU HAVE HEALTH CONCERNS TO DISCUSS AND FOR YOUR MEDICATION CHECK UP APPOINTMENTS.        Thank you.          "

## 2025-05-15 LAB — TSH SERPL-ACNC: 1.56 MIU/L (ref 0.4–4.5)

## 2025-05-16 ENCOUNTER — APPOINTMENT (OUTPATIENT)
Dept: GASTROENTEROLOGY | Facility: CLINIC | Age: 65
End: 2025-05-16
Payer: COMMERCIAL

## 2025-05-20 DIAGNOSIS — E03.9 HYPOTHYROIDISM, UNSPECIFIED TYPE: ICD-10-CM

## 2025-05-20 RX ORDER — LEVOTHYROXINE SODIUM 112 UG/1
112 TABLET ORAL DAILY
Qty: 90 TABLET | Refills: 3 | Status: SHIPPED | OUTPATIENT
Start: 2025-05-20

## 2025-06-30 ASSESSMENT — ENCOUNTER SYMPTOMS
CONSTIPATION: 0
DIARRHEA: 0
SHORTNESS OF BREATH: 0
LIGHT-HEADEDNESS: 0
VOMITING: 0
COUGH: 0
FATIGUE: 0
ABDOMINAL PAIN: 0
NAUSEA: 0
PALPITATIONS: 0
FEVER: 0
DIZZINESS: 0

## 2025-06-30 NOTE — PROGRESS NOTES
Subjective   Patient ID: Neelam Garsia is a 65 y.o. female who presents for Well Women Visit (Last PAP: 2020 : normal, HPV negative /Needs mammo order).  HPI   Menses absent due to menopause. Denies PMB. On Yuvafem for vaginal dryness, helping and would like to continue. Pap 2020 normal, HPV neg. Mammogram 2024 normal. Dexa scan 2024 showed osteoporosis.     Review of Systems   Constitutional:  Negative for fatigue and fever.   Respiratory:  Negative for cough and shortness of breath.    Cardiovascular:  Negative for chest pain and palpitations.   Gastrointestinal:  Negative for abdominal pain, constipation, diarrhea, nausea and vomiting.   Endocrine: Negative for cold intolerance and heat intolerance.   Genitourinary:  Negative for dyspareunia, pelvic pain, vaginal discharge and vaginal pain.   Neurological:  Negative for dizziness and light-headedness.       Objective   Physical Exam  Vitals reviewed.   Cardiovascular:      Rate and Rhythm: Normal rate.   Pulmonary:      Effort: Pulmonary effort is normal.   Abdominal:      General: Bowel sounds are normal.      Palpations: Abdomen is soft.   Genitourinary:     General: Normal vulva.   Musculoskeletal:         General: Normal range of motion.   Neurological:      Mental Status: She is alert. Mental status is at baseline.   Psychiatric:         Mood and Affect: Mood normal.         Assessment/Plan   Diagnoses and all orders for this visit:  Well woman exam with routine gynecological exam  Encounter for screening for cervical cancer  -     THINPREP PAP TEST (>30)  Encounter for screening mammogram for malignant neoplasm of breast  -     BI mammo bilateral screening tomosynthesis; Future  - Reviewed healthy eating habits and exercise. Recommended 30 min a day at least 3 days a week including weight bearing exercise.   - Recommended self breast exam  - RTO 1 year or as needed           KENDALL Ashby 07/01/25 2:21 PM

## 2025-07-01 ENCOUNTER — APPOINTMENT (OUTPATIENT)
Facility: CLINIC | Age: 65
End: 2025-07-01
Payer: COMMERCIAL

## 2025-07-01 VITALS
DIASTOLIC BLOOD PRESSURE: 76 MMHG | SYSTOLIC BLOOD PRESSURE: 120 MMHG | BODY MASS INDEX: 27.75 KG/M2 | WEIGHT: 147 LBS | HEIGHT: 61 IN

## 2025-07-01 DIAGNOSIS — Z01.419 WELL WOMAN EXAM WITH ROUTINE GYNECOLOGICAL EXAM: Primary | ICD-10-CM

## 2025-07-01 DIAGNOSIS — Z12.4 ENCOUNTER FOR SCREENING FOR CERVICAL CANCER: ICD-10-CM

## 2025-07-01 DIAGNOSIS — Z12.31 ENCOUNTER FOR SCREENING MAMMOGRAM FOR MALIGNANT NEOPLASM OF BREAST: ICD-10-CM

## 2025-07-01 DIAGNOSIS — N95.2 VAGINAL ATROPHY: ICD-10-CM

## 2025-07-01 PROCEDURE — 99396 PREV VISIT EST AGE 40-64: CPT | Performed by: ADVANCED PRACTICE MIDWIFE

## 2025-07-01 PROCEDURE — 1036F TOBACCO NON-USER: CPT | Performed by: ADVANCED PRACTICE MIDWIFE

## 2025-07-01 PROCEDURE — 87626 HPV SEP HI-RSK TYP&POOL RSLT: CPT

## 2025-07-01 PROCEDURE — 3008F BODY MASS INDEX DOCD: CPT | Performed by: ADVANCED PRACTICE MIDWIFE

## 2025-07-01 RX ORDER — ESTRADIOL 10 UG/1
10 TABLET, FILM COATED VAGINAL 2 TIMES WEEKLY
Qty: 8 TABLET | Refills: 11 | Status: SHIPPED | OUTPATIENT
Start: 2025-07-03

## 2025-07-01 ASSESSMENT — ENCOUNTER SYMPTOMS
LOSS OF SENSATION IN FEET: 0
DEPRESSION: 0
OCCASIONAL FEELINGS OF UNSTEADINESS: 0

## 2025-07-01 ASSESSMENT — COLUMBIA-SUICIDE SEVERITY RATING SCALE - C-SSRS
2. HAVE YOU ACTUALLY HAD ANY THOUGHTS OF KILLING YOURSELF?: NO
1. IN THE PAST MONTH, HAVE YOU WISHED YOU WERE DEAD OR WISHED YOU COULD GO TO SLEEP AND NOT WAKE UP?: NO
6. HAVE YOU EVER DONE ANYTHING, STARTED TO DO ANYTHING, OR PREPARED TO DO ANYTHING TO END YOUR LIFE?: NO

## 2025-07-01 ASSESSMENT — PATIENT HEALTH QUESTIONNAIRE - PHQ9
1. LITTLE INTEREST OR PLEASURE IN DOING THINGS: NOT AT ALL
2. FEELING DOWN, DEPRESSED OR HOPELESS: NOT AT ALL
SUM OF ALL RESPONSES TO PHQ9 QUESTIONS 1 AND 2: 0

## 2025-07-01 ASSESSMENT — PAIN SCALES - GENERAL: PAINLEVEL_OUTOF10: 0-NO PAIN

## 2025-07-15 ENCOUNTER — TELEPHONE (OUTPATIENT)
Facility: CLINIC | Age: 65
End: 2025-07-15
Payer: COMMERCIAL

## 2025-07-17 LAB
CYTOLOGY CMNT CVX/VAG CYTO-IMP: NORMAL
HPV HR 12 DNA GENITAL QL NAA+PROBE: NEGATIVE
HPV HR GENOTYPES PNL CVX NAA+PROBE: NEGATIVE
HPV16 DNA SPEC QL NAA+PROBE: NEGATIVE
HPV18 DNA SPEC QL NAA+PROBE: NEGATIVE
LAB AP HPV GENOTYPE QUESTION: YES
LAB AP HPV HR: NORMAL
LABORATORY COMMENT REPORT: NORMAL
PATH REPORT.TOTAL CANCER: NORMAL

## 2025-07-29 ENCOUNTER — APPOINTMENT (OUTPATIENT)
Dept: RADIOLOGY | Facility: HOSPITAL | Age: 65
End: 2025-07-29
Payer: COMMERCIAL

## 2025-08-05 ENCOUNTER — APPOINTMENT (OUTPATIENT)
Dept: RADIOLOGY | Facility: HOSPITAL | Age: 65
End: 2025-08-05
Payer: COMMERCIAL

## 2025-10-21 ENCOUNTER — APPOINTMENT (OUTPATIENT)
Dept: RADIOLOGY | Facility: HOSPITAL | Age: 65
End: 2025-10-21
Payer: COMMERCIAL

## 2026-07-09 ENCOUNTER — APPOINTMENT (OUTPATIENT)
Facility: CLINIC | Age: 66
End: 2026-07-09
Payer: COMMERCIAL